# Patient Record
Sex: FEMALE | Race: WHITE | Employment: FULL TIME
[De-identification: names, ages, dates, MRNs, and addresses within clinical notes are randomized per-mention and may not be internally consistent; named-entity substitution may affect disease eponyms.]

---

## 2020-05-11 ENCOUNTER — NURSE TRIAGE (OUTPATIENT)
Dept: OTHER | Facility: CLINIC | Age: 55
End: 2020-05-11

## 2020-05-12 ENCOUNTER — OFFICE VISIT (OUTPATIENT)
Dept: PRIMARY CARE CLINIC | Age: 55
End: 2020-05-12
Payer: COMMERCIAL

## 2020-05-12 VITALS — HEART RATE: 75 BPM | OXYGEN SATURATION: 98 % | TEMPERATURE: 98.8 F

## 2020-05-12 PROCEDURE — G8428 CUR MEDS NOT DOCUMENT: HCPCS | Performed by: NURSE PRACTITIONER

## 2020-05-12 PROCEDURE — G8421 BMI NOT CALCULATED: HCPCS | Performed by: NURSE PRACTITIONER

## 2020-05-12 PROCEDURE — 3017F COLORECTAL CA SCREEN DOC REV: CPT | Performed by: NURSE PRACTITIONER

## 2020-05-12 PROCEDURE — 4004F PT TOBACCO SCREEN RCVD TLK: CPT | Performed by: NURSE PRACTITIONER

## 2020-05-12 PROCEDURE — 99212 OFFICE O/P EST SF 10 MIN: CPT | Performed by: NURSE PRACTITIONER

## 2020-05-12 NOTE — PROGRESS NOTES
5/12/2020    FLU/COVID-19 CLINIC EVALUATION    HPI  SYMPTOMS:    Symptom duration, days:  [] 1   [] 2   [x] 3   [] 4   [] 5   [] 6   [] 7   [] 8   [] 9   [] 10   [] 11   [] 12   [] 13 [] 14 +      Symptom course:   [] Worsening     [x] Stable     [] Improving    [] Fevers  [] Symptom (not measured)  [] Measured (Result: )  [] Chills  [] Cough  [] Coughing up blood  [] Productive  [] Dry  [] Chest Congestion  [x] Chest Tightness  [] Nasal Congestion  [] Runny Nose  [x] Feeling short of breath  [] Fatigue  [] Chest pain  [x] Headaches  [x]Tolerable  [] Severe  [] Sore throat  [] Muscle aches  [] Nausea  [] Decreased appetite  [] Vomiting  []Unable to keep fluids down  [] Diarrhea  []Severe    [x] OTHER SYMPTOMS:   Post nasal drip (allergies)    RISK FACTORS: No known exposure  [] Pregnant or possibly pregnant  [] Age over 61  [] Diabetes  [] Heart disease  [] Asthma  [] COPD/Other chronic lung diseases  [] Active Cancer  [] On Chemotherapy  [] Taking oral steroids  [] History Lymphoma/Leukemia  [] Close contact with a lab confirmed COVID-19 patient within 14 days of symptom onset  [] History of travel from affected geographical areas within 14 days of symptom onset  [] Health Care Worker Exposure no symptoms  [] Health Care Worker Exposure symptomatic      VITALS:  Vitals:    05/12/20 0903   Pulse: 75   Temp: 98.8 °F (37.1 °C)   TempSrc: Oral   SpO2: 98%        PHYSICAL EXAMINATION:  [x]Alert   [x]Oriented to person/place/time    [x]No apparent distress     []Toxic appearing    [x]Breathing appears normal     []Appears tachypneic         [x]Speaking in full sentences     TESTS:  POCT FLU:  [] Positive     []Negative  POCT STREP:  [] Positive     []Negative    [x] COVID-19 Test sent: [x] Blue   [] Red   [] Chest X-ray     ASSESSMENT:  [] Flu  [] Strep Throat  [] Uncertain Viral Respiratory Illness  [x] Possible COVID-19  [] Exposure COVID-19  [] Other:     PLAN:  [x] Discharge home with written instructions for:  [] Flu management  [] Strep throat management  [] Possible COVID-19 exposure with self-quarantine   [x] Possible COVID-19 with isolation instructions and management of symptoms  [] Follow-up with primary care physician or emergency department if worsens  [] Referred to emergency department for evaluation    [x] Evaluation per physician/nurse practitioner in clinic  [] Prescription sent in  [x] No Prescription sent in    An  electronic signature was used to authenticate this note.      --BEBE Melgar - CNP on 5/12/2020 at 9:33 AM

## 2020-05-12 NOTE — PATIENT INSTRUCTIONS
before eating or preparing food. If soap and water are not readily available, use an alcohol-based hand  with at least 60% alcohol, covering all surfaces of your hands and rubbing them together until they feel dry. Soap and water are the best option if hands are visibly dirty. Avoid touching your eyes, nose, and mouth with unwashed hands. Avoid sharing personal household items  You should not share dishes, drinking glasses, cups, eating utensils, towels, or bedding with other people or pets in your home. After using these items, they should be washed thoroughly with soap and water. Clean all high-touch surfaces everyday  High touch surfaces include counters, tabletops, doorknobs, bathroom fixtures, toilets, phones, keyboards, tablets, and bedside tables. Also, clean any surfaces that may have blood, stool, or body fluids on them. Use a household cleaning spray or wipe, according to the label instructions. Labels contain instructions for safe and effective use of the cleaning product including precautions you should take when applying the product, such as wearing gloves and making sure you have good ventilation during use of the product. Monitor your symptoms  Seek prompt medical attention if your illness is worsening (e.g., difficulty breathing). Before seeking care, call your healthcare provider and tell them that you have, or are being evaluated for, COVID-19. Put on a facemask before you enter the facility. These steps will help the healthcare providers office to keep other people in the office or waiting room from getting infected or exposed. Ask your healthcare provider to call the local or state health department. Persons who are placed under active monitoring or facilitated self-monitoring should follow instructions provided by their local health department or occupational health professionals, as appropriate. When working with your local health department check their available hours.   If you You can now view your medical record. Additional Information  If you have questions, please contact your physician practice where you receive care. Remember, [facility] is NOT to be used for urgent needs. For medical emergencies, dial 911.

## 2020-05-14 LAB
SARS-COV-2: NOT DETECTED
SOURCE: NORMAL

## 2020-05-20 ENCOUNTER — VIRTUAL VISIT (OUTPATIENT)
Dept: FAMILY MEDICINE CLINIC | Age: 55
End: 2020-05-20
Payer: COMMERCIAL

## 2020-05-20 PROCEDURE — 99203 OFFICE O/P NEW LOW 30 MIN: CPT | Performed by: NURSE PRACTITIONER

## 2020-05-20 PROCEDURE — 3017F COLORECTAL CA SCREEN DOC REV: CPT | Performed by: NURSE PRACTITIONER

## 2020-05-20 PROCEDURE — G8427 DOCREV CUR MEDS BY ELIG CLIN: HCPCS | Performed by: NURSE PRACTITIONER

## 2020-05-20 ASSESSMENT — ENCOUNTER SYMPTOMS
COUGH: 0
SHORTNESS OF BREATH: 0
WHEEZING: 0

## 2020-05-20 NOTE — PROGRESS NOTES
2020    TELEHEALTH EVALUATION -- Audio/Visual (During ASTGI-05 public health emergency)    HPI:    Reg Guevara (:  1965) has requested an audio/video evaluation for the following concern(s):    Pt was seeing Dr Donal Vasquez in . Pt works at [employer], she is banker, and works at [employer]. Pt was seen in the [facility] pt said it lasted for a day or two more but has not had sob or chest discomfort is resolved. Pt said she has been working since covid, she has been off for 14 days. Review of Systems   Respiratory: Negative for cough, shortness of breath and wheezing. All other systems reviewed and are negative. Prior to Visit Medications    Medication Sig Taking? Authorizing Provider   ESTERIFIED ESTROGENS PO Take 1 mg by mouth Yes Historical Provider, MD   PROGESTERONE MICRONIZED PO Take 2.5 mg by mouth Yes Historical Provider, MD       Social History     Tobacco Use    Smoking status: Former Smoker     Years: 20.00     Types: Cigarettes    Smokeless tobacco: Never Used   Substance Use Topics    Alcohol use: Yes     Comment: socially     Drug use: Never        No Known Allergies    PHYSICAL EXAMINATION:  [ INSTRUCTIONS:  \"[x]\" Indicates a positive item  \"[]\" Indicates a negative item  -- DELETE ALL ITEMS NOT EXAMINED]  Vital Signs: (As obtained by patient/caregiver or practitioner observation)    Blood pressure-  Heart rate-    Respiratory rate-    Temperature-  Pulse oximetry-     Constitutional: [x] Appears well-developed and well-nourished [x] No apparent distress      [] Abnormal-   Mental status  [x] Alert and awake  [x] Oriented to person/place/time []Able to follow commands      Eyes:  EOM    []  Normal  [] Abnormal-  Sclera  []  Normal  [] Abnormal -         Discharge []  None visible  [] Abnormal -    HENT:   [] Normocephalic, atraumatic.   [] Abnormal   [] Mouth/Throat: Mucous membranes are moist.     External Ears [] Normal  [] Abnormal-     Neck: [] No visualized mass     Pulmonary/Chest: [] Respiratory effort normal.  [] No visualized signs of difficulty breathing or respiratory distress        [] Abnormal-      Musculoskeletal:   [] Normal gait with no signs of ataxia         [] Normal range of motion of neck        [] Abnormal-       Neurological:        [] No Facial Asymmetry (Cranial nerve 7 motor function) (limited exam to video visit)          [] No gaze palsy        [] Abnormal-         Skin:        [] No significant exanthematous lesions or discoloration noted on facial skin         [] Abnormal-            Psychiatric:       [x] Normal Affect [x] No Hallucinations        [] Abnormal-     Other pertinent observable physical exam findings-     ASSESSMENT/PLAN:  1. Cough  Pt is feeling much better she has been free of symptoms for 72 hours so she can return to work, pt will follow up with me in the next couple of months for physical.       No follow-ups on file. Reg Guevara is a 48 y.o. female being evaluated by a Virtual Visit (video visit) encounter to address concerns as mentioned above. A caregiver was present when appropriate. Due to this being a TeleHealth encounter (During ZDIXS-14 public health emergency), evaluation of the following organ systems was limited: Vitals/Constitutional/EENT/Resp/CV/GI//MS/Neuro/Skin/Heme-Lymph-Imm. Pursuant to the emergency declaration under the [address] [place] [address] authority and the [employer] Act, this Virtual Visit was conducted with patient's (and/or legal guardian's) consent, to reduce the patient's risk of exposure to COVID-19 and provide necessary medical care. The patient (and/or legal guardian) has also been advised to contact this office for worsening conditions or problems, and seek emergency medical treatment and/or call 911 if deemed necessary.      Patient identification was verified at the start of the visit: Yes    Total time spent on this encounter: Not billed by time    Services were provided through a video synchronous discussion virtually to substitute for in-person clinic visit. Patient and provider were located at their individual homes. --BEBE Browne CNP on 5/20/2020 at 3:06 PM    An electronic signature was used to authenticate this note.

## 2020-10-19 ENCOUNTER — OFFICE VISIT (OUTPATIENT)
Dept: FAMILY MEDICINE CLINIC | Age: 55
End: 2020-10-19
Payer: COMMERCIAL

## 2020-10-19 ENCOUNTER — NURSE TRIAGE (OUTPATIENT)
Dept: OTHER | Facility: CLINIC | Age: 55
End: 2020-10-19

## 2020-10-19 VITALS
SYSTOLIC BLOOD PRESSURE: 102 MMHG | OXYGEN SATURATION: 97 % | HEART RATE: 61 BPM | TEMPERATURE: 97.8 F | DIASTOLIC BLOOD PRESSURE: 72 MMHG | WEIGHT: 181 LBS

## 2020-10-19 LAB
A/G RATIO: 2.2 (ref 1.1–2.2)
ALBUMIN SERPL-MCNC: 4.4 G/DL (ref 3.4–5)
ALP BLD-CCNC: 52 U/L (ref 40–129)
ALT SERPL-CCNC: 12 U/L (ref 10–40)
ANION GAP SERPL CALCULATED.3IONS-SCNC: 11 MMOL/L (ref 3–16)
AST SERPL-CCNC: 14 U/L (ref 15–37)
BASOPHILS ABSOLUTE: 0 K/UL (ref 0–0.2)
BASOPHILS RELATIVE PERCENT: 0.6 %
BILIRUB SERPL-MCNC: 0.3 MG/DL (ref 0–1)
BUN BLDV-MCNC: 11 MG/DL (ref 7–20)
CALCIUM SERPL-MCNC: 9.3 MG/DL (ref 8.3–10.6)
CHLORIDE BLD-SCNC: 102 MMOL/L (ref 99–110)
CHOLESTEROL, TOTAL: 211 MG/DL (ref 0–199)
CO2: 25 MMOL/L (ref 21–32)
CREAT SERPL-MCNC: 0.7 MG/DL (ref 0.6–1.1)
EOSINOPHILS ABSOLUTE: 0.2 K/UL (ref 0–0.6)
EOSINOPHILS RELATIVE PERCENT: 2.3 %
GFR AFRICAN AMERICAN: >60
GFR NON-AFRICAN AMERICAN: >60
GLOBULIN: 2 G/DL
GLUCOSE BLD-MCNC: 85 MG/DL (ref 70–99)
HCT VFR BLD CALC: 37.9 % (ref 36–48)
HDLC SERPL-MCNC: 75 MG/DL (ref 40–60)
HEMOGLOBIN: 12.6 G/DL (ref 12–16)
LDL CHOLESTEROL CALCULATED: 124 MG/DL
LYMPHOCYTES ABSOLUTE: 2 K/UL (ref 1–5.1)
LYMPHOCYTES RELATIVE PERCENT: 27.7 %
MCH RBC QN AUTO: 31.1 PG (ref 26–34)
MCHC RBC AUTO-ENTMCNC: 33.3 G/DL (ref 31–36)
MCV RBC AUTO: 93.3 FL (ref 80–100)
MONOCYTES ABSOLUTE: 0.6 K/UL (ref 0–1.3)
MONOCYTES RELATIVE PERCENT: 8 %
NEUTROPHILS ABSOLUTE: 4.4 K/UL (ref 1.7–7.7)
NEUTROPHILS RELATIVE PERCENT: 61.4 %
PDW BLD-RTO: 13.2 % (ref 12.4–15.4)
PLATELET # BLD: 268 K/UL (ref 135–450)
PMV BLD AUTO: 8.8 FL (ref 5–10.5)
POTASSIUM SERPL-SCNC: 4 MMOL/L (ref 3.5–5.1)
RBC # BLD: 4.06 M/UL (ref 4–5.2)
SODIUM BLD-SCNC: 138 MMOL/L (ref 136–145)
TOTAL PROTEIN: 6.4 G/DL (ref 6.4–8.2)
TRIGL SERPL-MCNC: 62 MG/DL (ref 0–150)
TSH REFLEX: 2.98 UIU/ML (ref 0.27–4.2)
VLDLC SERPL CALC-MCNC: 12 MG/DL
WBC # BLD: 7.2 K/UL (ref 4–11)

## 2020-10-19 PROCEDURE — G8482 FLU IMMUNIZE ORDER/ADMIN: HCPCS | Performed by: NURSE PRACTITIONER

## 2020-10-19 PROCEDURE — 90471 IMMUNIZATION ADMIN: CPT | Performed by: NURSE PRACTITIONER

## 2020-10-19 PROCEDURE — 36415 COLL VENOUS BLD VENIPUNCTURE: CPT | Performed by: NURSE PRACTITIONER

## 2020-10-19 PROCEDURE — 90686 IIV4 VACC NO PRSV 0.5 ML IM: CPT | Performed by: NURSE PRACTITIONER

## 2020-10-19 PROCEDURE — 99396 PREV VISIT EST AGE 40-64: CPT | Performed by: NURSE PRACTITIONER

## 2020-10-19 ASSESSMENT — PATIENT HEALTH QUESTIONNAIRE - PHQ9
SUM OF ALL RESPONSES TO PHQ QUESTIONS 1-9: 0
2. FEELING DOWN, DEPRESSED OR HOPELESS: 0
SUM OF ALL RESPONSES TO PHQ QUESTIONS 1-9: 0
SUM OF ALL RESPONSES TO PHQ QUESTIONS 1-9: 0
1. LITTLE INTEREST OR PLEASURE IN DOING THINGS: 0
SUM OF ALL RESPONSES TO PHQ9 QUESTIONS 1 & 2: 0

## 2020-10-19 ASSESSMENT — ENCOUNTER SYMPTOMS
NAUSEA: 0
VOMITING: 0
COUGH: 0
DIARRHEA: 0
RECTAL PAIN: 0
WHEEZING: 0
SHORTNESS OF BREATH: 0

## 2020-10-19 NOTE — PROGRESS NOTES
Vaccine Information Sheet, \"Influenza - Inactivated\"  given to [employer], or parent/legal guardian of  [employer] and verbalized understanding. Patient responses:    Have you ever had a reaction to a flu vaccine? No  Are you able to eat eggs without adverse effects? Yes  Do you have any current illness? No  Have you ever had Guillian [place] Syndrome? No    Flu vaccine given per order. Please see immunization tab.

## 2020-10-19 NOTE — PROGRESS NOTES
OUTPATIENT PROGRESS NOTE  Date of Service:  10/19/2020  Address: [address] [place] [address] [facility]  [address] [place], [place] [address]  [address] [place] [address]  Dept: [phone]  Loc: [phone]    Subjective:      Patient ID:  <[id number]>  Jamil Zambrano is a 50 y.o. female    HPI: pt is here with facial swelling, pt said it started yesterday. Pt said she put ice on her face. Pt did not take any medication prior to the swelling. Pt has not eaten anything she has not eaten before. She pt said face feels funny if she touches. Pt said she did not have any new make up or face lotions. Pt said left side does feel a little numb. Pt said swelling was worse over the eyes. Pt does not typical have sensitive skin or have sensitive feeling in her face. Pt said she did not notice her face drooping. Pt is getting  Up once at night to urinate, other then that she sleeps well. Pt works 2 jobs, she can feel tired at the end of the day     Review of Systems   Constitutional: Negative for diaphoresis, fatigue and fever. HENT:        Facial swelling    Respiratory: Negative for cough, shortness of breath and wheezing. Gastrointestinal: Negative for diarrhea, nausea, rectal pain and vomiting. Psychiatric/Behavioral: Positive for sleep disturbance (wakes up ). All other systems reviewed and are negative. Objective:   Physical Exam  Vitals signs reviewed. Constitutional:       Appearance: Normal appearance. HENT:      Head:      Comments: Mild swelling on face   Cardiovascular:      Rate and Rhythm: Normal rate and regular rhythm. Pulses: Normal pulses. Heart sounds: Normal heart sounds. Pulmonary:      Effort: Pulmonary effort is normal.      Breath sounds: Normal breath sounds. Abdominal:      General: Abdomen is flat. Skin:     Capillary Refill: Capillary refill takes less than 2 seconds. Neurological:      General: No focal deficit present.       Mental Status: She is alert and oriented to person, place, and time. Psychiatric:         Mood and Affect: Mood normal.         Behavior: Behavior normal.         Thought Content: Thought content normal.         Judgment: Judgment normal.           Assessment/Plan   1. Edema of both orbits  Unsure etiology of patient's orbital edema. Patient's edema has improved today. Patient can continue Benadryl at night suggested patient take Allegra for the next month or 2 for possible allergies. Next up would be allergy panel with an allergist.  Patient will get regular blood work today. - CBC Auto Differential  - Comprehensive Metabolic Panel  - TSH with Reflex    2. Need for influenza vaccination  Pt is due for flu   - INFLUENZA, QUADV, 3 YRS AND OLDER, IM PF, PREFILL SYR OR SDV, 0.5ML (AFLURIA QUADV, PF)    3. Encounter for preventive care  - pt is due for cholesterol she is not fasting. Will get redrawn if abnormal fasting in 6 months   - Lipid Panel    4.  Screen for colon cancer  Due for colon cancer screening   - Macrina Joshi MD, Gastroenterology, [facility]      Pt will follow up in 1 year as long as blood work is ok               Luis Cody, APRN - CNP

## 2022-03-22 SDOH — HEALTH STABILITY: PHYSICAL HEALTH: ON AVERAGE, HOW MANY DAYS PER WEEK DO YOU ENGAGE IN MODERATE TO STRENUOUS EXERCISE (LIKE A BRISK WALK)?: 2 DAYS

## 2022-03-22 SDOH — HEALTH STABILITY: PHYSICAL HEALTH: ON AVERAGE, HOW MANY MINUTES DO YOU ENGAGE IN EXERCISE AT THIS LEVEL?: 30 MIN

## 2022-03-22 ASSESSMENT — SOCIAL DETERMINANTS OF HEALTH (SDOH)
WITHIN THE LAST YEAR, HAVE YOU BEEN HUMILIATED OR EMOTIONALLY ABUSED IN OTHER WAYS BY YOUR PARTNER OR EX-PARTNER?: NO
WITHIN THE LAST YEAR, HAVE YOU BEEN AFRAID OF YOUR PARTNER OR EX-PARTNER?: NO
WITHIN THE LAST YEAR, HAVE TO BEEN RAPED OR FORCED TO HAVE ANY KIND OF SEXUAL ACTIVITY BY YOUR PARTNER OR EX-PARTNER?: NO
WITHIN THE LAST YEAR, HAVE YOU BEEN KICKED, HIT, SLAPPED, OR OTHERWISE PHYSICALLY HURT BY YOUR PARTNER OR EX-PARTNER?: NO

## 2022-03-25 ENCOUNTER — OFFICE VISIT (OUTPATIENT)
Dept: FAMILY MEDICINE CLINIC | Age: 57
End: 2022-03-25
Payer: COMMERCIAL

## 2022-03-25 VITALS
BODY MASS INDEX: 32.47 KG/M2 | DIASTOLIC BLOOD PRESSURE: 72 MMHG | HEIGHT: 66 IN | SYSTOLIC BLOOD PRESSURE: 118 MMHG | OXYGEN SATURATION: 98 % | RESPIRATION RATE: 16 BRPM | HEART RATE: 57 BPM | WEIGHT: 202 LBS

## 2022-03-25 DIAGNOSIS — R35.0 URINARY FREQUENCY: Primary | ICD-10-CM

## 2022-03-25 DIAGNOSIS — Z76.89 ENCOUNTER TO ESTABLISH CARE: ICD-10-CM

## 2022-03-25 DIAGNOSIS — Z13.220 SCREENING FOR LIPID DISORDERS: ICD-10-CM

## 2022-03-25 DIAGNOSIS — Z13.1 SCREENING FOR DIABETES MELLITUS: ICD-10-CM

## 2022-03-25 DIAGNOSIS — Z13.29 SCREENING FOR THYROID DISORDER: ICD-10-CM

## 2022-03-25 LAB
BILIRUBIN, POC: NORMAL
BLOOD URINE, POC: NORMAL
CLARITY, POC: NORMAL
COLOR, POC: NORMAL
GLUCOSE URINE, POC: NEGATIVE
KETONES, POC: NORMAL
LEUKOCYTE EST, POC: NORMAL
NITRITE, POC: NEGATIVE
PH, POC: 6.5
PROTEIN, POC: NEGATIVE
SPECIFIC GRAVITY, POC: 1.02
UROBILINOGEN, POC: 0.2

## 2022-03-25 PROCEDURE — G8417 CALC BMI ABV UP PARAM F/U: HCPCS | Performed by: NURSE PRACTITIONER

## 2022-03-25 PROCEDURE — 81002 URINALYSIS NONAUTO W/O SCOPE: CPT | Performed by: NURSE PRACTITIONER

## 2022-03-25 PROCEDURE — 3017F COLORECTAL CA SCREEN DOC REV: CPT | Performed by: NURSE PRACTITIONER

## 2022-03-25 PROCEDURE — 99214 OFFICE O/P EST MOD 30 MIN: CPT | Performed by: NURSE PRACTITIONER

## 2022-03-25 PROCEDURE — G8427 DOCREV CUR MEDS BY ELIG CLIN: HCPCS | Performed by: NURSE PRACTITIONER

## 2022-03-25 PROCEDURE — G8484 FLU IMMUNIZE NO ADMIN: HCPCS | Performed by: NURSE PRACTITIONER

## 2022-03-25 PROCEDURE — 36415 COLL VENOUS BLD VENIPUNCTURE: CPT | Performed by: NURSE PRACTITIONER

## 2022-03-25 PROCEDURE — 1036F TOBACCO NON-USER: CPT | Performed by: NURSE PRACTITIONER

## 2022-03-25 ASSESSMENT — PATIENT HEALTH QUESTIONNAIRE - PHQ9
SUM OF ALL RESPONSES TO PHQ QUESTIONS 1-9: 0
SUM OF ALL RESPONSES TO PHQ QUESTIONS 1-9: 0
3. TROUBLE FALLING OR STAYING ASLEEP: 0
6. FEELING BAD ABOUT YOURSELF - OR THAT YOU ARE A FAILURE OR HAVE LET YOURSELF OR YOUR FAMILY DOWN: 0
1. LITTLE INTEREST OR PLEASURE IN DOING THINGS: 0
SUM OF ALL RESPONSES TO PHQ QUESTIONS 1-9: 0
SUM OF ALL RESPONSES TO PHQ9 QUESTIONS 1 & 2: 0
10. IF YOU CHECKED OFF ANY PROBLEMS, HOW DIFFICULT HAVE THESE PROBLEMS MADE IT FOR YOU TO DO YOUR WORK, TAKE CARE OF THINGS AT HOME, OR GET ALONG WITH OTHER PEOPLE: 0
5. POOR APPETITE OR OVEREATING: 0
8. MOVING OR SPEAKING SO SLOWLY THAT OTHER PEOPLE COULD HAVE NOTICED. OR THE OPPOSITE, BEING SO FIGETY OR RESTLESS THAT YOU HAVE BEEN MOVING AROUND A LOT MORE THAN USUAL: 0
9. THOUGHTS THAT YOU WOULD BE BETTER OFF DEAD, OR OF HURTING YOURSELF: 0
7. TROUBLE CONCENTRATING ON THINGS, SUCH AS READING THE NEWSPAPER OR WATCHING TELEVISION: 0
SUM OF ALL RESPONSES TO PHQ QUESTIONS 1-9: 0
4. FEELING TIRED OR HAVING LITTLE ENERGY: 0
2. FEELING DOWN, DEPRESSED OR HOPELESS: 0

## 2022-03-25 ASSESSMENT — COLUMBIA-SUICIDE SEVERITY RATING SCALE - C-SSRS
6. HAVE YOU EVER DONE ANYTHING, STARTED TO DO ANYTHING, OR PREPARED TO DO ANYTHING TO END YOUR LIFE?: NO
1. WITHIN THE PAST MONTH, HAVE YOU WISHED YOU WERE DEAD OR WISHED YOU COULD GO TO SLEEP AND NOT WAKE UP?: NO
2. HAVE YOU ACTUALLY HAD ANY THOUGHTS OF KILLING YOURSELF?: NO

## 2022-03-25 ASSESSMENT — ENCOUNTER SYMPTOMS
GASTROINTESTINAL NEGATIVE: 1
RESPIRATORY NEGATIVE: 1
EYES NEGATIVE: 1

## 2022-03-25 NOTE — PROGRESS NOTES
[facility]  Establish care visit   3/25/2022    Claudean Rubenstein (:  1965) is a 49 y.o. female, here to establish care. Chief Complaint   Patient presents with   Myrick Establish Care     Pt is here to establish care, and patient is fasting         ASSESSMENT/ PLAN  1. Urinary frequency  - POCT Urine with Microscopic    2. Encounter to establish care  - CBC with Auto Differential  - Comprehensive Metabolic Panel    3. Screening for diabetes mellitus  - Hemoglobin A1C    4. Screening for lipid disorders  - Lipid Panel    5. Screening for thyroid disorder  - TSH  - T4, Free       No follow-ups on file. HPI  Patient is here to establish care, concerned about weight gain since hysterectomy, c/o of urinary frequency. Patient stated it started a week ago and she is her to f/u. She denied any discharge. ROS  Review of Systems   Constitutional: Negative. HENT: Negative. Eyes: Negative. Respiratory: Negative. Cardiovascular: Negative. Gastrointestinal: Negative. Endocrine: Negative. Genitourinary: Positive for frequency. Musculoskeletal: Negative. Skin: Negative. Neurological: Negative. Hematological: Negative. Psychiatric/Behavioral: Negative. HISTORIES  No current outpatient medications on file prior to visit. No current facility-administered medications on file prior to visit. No Known Allergies    Past Medical History:   Diagnosis Date    Allergic rhinitis        There is no problem list on file for this patient.       Past Surgical History:   Procedure Laterality Date    HYSTERECTOMY, VAGINAL         Social History     Socioeconomic History    Marital status:      Spouse name: Not on file    Number of children: Not on file    Years of education: Not on file    Highest education level: Not on file   Occupational History    Not on file   Tobacco Use    Smoking status: Former Smoker     Years: 20.00     Types: Cigarettes    Smokeless tobacco: Never Used   Vaping Use    Vaping Use: Never used   Substance and Sexual Activity    Alcohol use: Yes     Alcohol/week: 2.0 - 3.0 standard drinks     Types: 2 - 3 Glasses of wine per week     Comment: socially     Drug use: Never    Sexual activity: Yes     Partners: Male   Other Topics Concern    Not on file   Social History Narrative    Not on file     Social Determinants of Health     Financial Resource Strain:     Difficulty of Paying Living Expenses: Not on file   Food Insecurity:     Worried About Running Out of Food in the Last Year: Not on file    Mikey of Food in the Last Year: Not on file   Transportation Needs:     Lack of Transportation (Medical): Not on file    Lack of Transportation (Non-Medical):  Not on file   Physical Activity: Insufficiently Active    Days of Exercise per Week: 2 days    Minutes of Exercise per Session: 30 min   Stress:     Feeling of Stress : Not on file   Social Connections:     Frequency of Communication with Friends and Family: Not on file    Frequency of Social Gatherings with Friends and Family: Not on file    Attends Gnosticism Services: Not on file    Active Member of Clubs or Organizations: Not on file    Attends Club or Organization Meetings: Not on file    Marital Status: Not on file   Intimate Partner Violence: Not At Risk    Fear of Current or Ex-Partner: No    Emotionally Abused: No    Physically Abused: No    Sexually Abused: No   Housing Stability:     Unable to Pay for Housing in the Last Year: Not on file    Number of [place] in the Last Year: Not on file    Unstable Housing in the Last Year: Not on file        Family History   Problem Relation Age of Onset    Cancer Mother         bladder cancer       PE  Vitals:    03/25/22 0747   BP: 118/72   Site: Right Upper Arm   Position: Sitting   Pulse: 57   Resp: 16   SpO2: 98%   Weight: 202 lb (91.6 kg)   Height: 5' 5.7\" (1.669 m)     Estimated body mass index is 32.9 kg/m² as calculated from the following:    Height as of this encounter: 5' 5.7\" (1.669 m). Weight as of this encounter: 202 lb (91.6 kg). Physical Exam  HENT:      Right Ear: Tympanic membrane and ear canal normal.      Left Ear: Tympanic membrane and ear canal normal.      Nose: Nose normal.      Mouth/Throat:      Mouth: Mucous membranes are moist.   Eyes:      Extraocular Movements: Extraocular movements intact. Cardiovascular:      Rate and Rhythm: Normal rate. Pulses: Normal pulses. Heart sounds: Normal heart sounds. Pulmonary:      Effort: Pulmonary effort is normal.      Breath sounds: Normal breath sounds. Abdominal:      General: Bowel sounds are normal.      Palpations: Abdomen is soft. Musculoskeletal:         General: Normal range of motion. Cervical back: Normal range of motion. Skin:     General: Skin is warm. Neurological:      General: No focal deficit present. Mental Status: She is alert and oriented to person, place, and time. Psychiatric:         Mood and Affect: Mood normal.         Behavior: Behavior normal.         Thought Content:  Thought content normal.         Judgment: Judgment normal.         Immunization History   Administered Date(s) Administered    Influenza, Quadv, IM, PF (6 mo and older Fluzone, Flulaval, Fluarix, and 3 yrs and older Afluria) 10/19/2020    Td Vaccine >6yo [facility] 04/12/2004    Tdap (Boostrix, Adacel) 10/28/2015       Health Maintenance   Topic Date Due    COVID-19 Vaccine (1) Never done    Colorectal Cancer Screen  Never done    Shingles Vaccine (1 of 2) Never done    Flu vaccine (1) 03/25/2023 (Originally 9/1/2021)    Breast cancer screen  05/25/2022    Depression Screen  03/25/2023    Lipid screen  10/19/2025    DTaP/Tdap/Td vaccine (2 - Td or Tdap) 10/28/2025    Hepatitis A vaccine  Aged Out    Hepatitis B vaccine  Aged Out    Hib vaccine  Aged Out    Meningococcal (ACWY) vaccine  Aged Out    Pneumococcal 0-64 years Vaccine  Aged Out    Hepatitis C screen  Discontinued    HIV screen  Discontinued       PSH, PMH, SH and FH reviewed and noted. Recent and past labs, tests and consults also reviewed. Recent or new meds also reviewed. BEBE Mcfadden CNP    This dictation was generated by voice recognition computer software. Although all attempts are made to edit the dictation for accuracy, there may be errors in the transcription that are not intended.

## 2022-03-26 LAB
A/G RATIO: 2.6 (ref 1.1–2.2)
ALBUMIN SERPL-MCNC: 5.1 G/DL (ref 3.4–5)
ALP BLD-CCNC: 69 U/L (ref 40–129)
ALT SERPL-CCNC: 22 U/L (ref 10–40)
ANION GAP SERPL CALCULATED.3IONS-SCNC: 19 MMOL/L (ref 3–16)
AST SERPL-CCNC: 18 U/L (ref 15–37)
BASOPHILS ABSOLUTE: 0.1 K/UL (ref 0–0.2)
BASOPHILS RELATIVE PERCENT: 0.9 %
BILIRUB SERPL-MCNC: 0.4 MG/DL (ref 0–1)
BUN BLDV-MCNC: 14 MG/DL (ref 7–20)
CALCIUM SERPL-MCNC: 9.8 MG/DL (ref 8.3–10.6)
CHLORIDE BLD-SCNC: 101 MMOL/L (ref 99–110)
CHOLESTEROL, TOTAL: 246 MG/DL (ref 0–199)
CO2: 23 MMOL/L (ref 21–32)
CREAT SERPL-MCNC: 0.6 MG/DL (ref 0.6–1.1)
EOSINOPHILS ABSOLUTE: 0.2 K/UL (ref 0–0.6)
EOSINOPHILS RELATIVE PERCENT: 3.7 %
ESTIMATED AVERAGE GLUCOSE: 108.3 MG/DL
GFR AFRICAN AMERICAN: >60
GFR NON-AFRICAN AMERICAN: >60
GLUCOSE BLD-MCNC: 68 MG/DL (ref 70–99)
HBA1C MFR BLD: 5.4 %
HCT VFR BLD CALC: 41.4 % (ref 36–48)
HDLC SERPL-MCNC: 63 MG/DL (ref 40–60)
HEMOGLOBIN: 13.7 G/DL (ref 12–16)
LDL CHOLESTEROL CALCULATED: 166 MG/DL
LYMPHOCYTES ABSOLUTE: 2.1 K/UL (ref 1–5.1)
LYMPHOCYTES RELATIVE PERCENT: 31.2 %
MCH RBC QN AUTO: 30.3 PG (ref 26–34)
MCHC RBC AUTO-ENTMCNC: 33.1 G/DL (ref 31–36)
MCV RBC AUTO: 91.5 FL (ref 80–100)
MONOCYTES ABSOLUTE: 0.4 K/UL (ref 0–1.3)
MONOCYTES RELATIVE PERCENT: 6.5 %
NEUTROPHILS ABSOLUTE: 3.8 K/UL (ref 1.7–7.7)
NEUTROPHILS RELATIVE PERCENT: 57.7 %
PDW BLD-RTO: 13.4 % (ref 12.4–15.4)
PLATELET # BLD: 316 K/UL (ref 135–450)
PMV BLD AUTO: 7.9 FL (ref 5–10.5)
POTASSIUM SERPL-SCNC: 4.5 MMOL/L (ref 3.5–5.1)
RBC # BLD: 4.53 M/UL (ref 4–5.2)
SODIUM BLD-SCNC: 143 MMOL/L (ref 136–145)
T4 FREE: 1.2 NG/DL (ref 0.9–1.8)
TOTAL PROTEIN: 7.1 G/DL (ref 6.4–8.2)
TRIGL SERPL-MCNC: 85 MG/DL (ref 0–150)
TSH SERPL DL<=0.05 MIU/L-ACNC: 2.45 UIU/ML (ref 0.27–4.2)
VLDLC SERPL CALC-MCNC: 17 MG/DL
WBC # BLD: 6.7 K/UL (ref 4–11)

## 2022-03-29 DIAGNOSIS — E78.5 HYPERLIPIDEMIA, UNSPECIFIED HYPERLIPIDEMIA TYPE: Primary | ICD-10-CM

## 2022-03-29 RX ORDER — ATORVASTATIN CALCIUM 20 MG/1
20 TABLET, FILM COATED ORAL DAILY
Qty: 90 TABLET | Refills: 1 | Status: SHIPPED | OUTPATIENT
Start: 2022-03-29 | End: 2022-09-23

## 2022-05-18 RX ORDER — ESTRADIOL 0.5 MG/1
0.5 TABLET ORAL DAILY
COMMUNITY

## 2022-05-18 NOTE — PROGRESS NOTES

## 2022-05-18 NOTE — PROGRESS NOTES
Obstructive Sleep Apnea (KEN) Screening     Patient:  Lucille Paz    YOB: 1965      Medical Record #:  [id number]                     Date:  5/18/2022     1. Are you a loud and/or regular snorer? []  Yes       [x] No    2. Have you been observed to gasp or stop breathing during sleep? []  Yes       [x] No    3. Do you feel tired or groggy upon awakening or do you awaken with a headache?           []  Yes       [] No    4. Are you often tired or fatigued during the wake time hours? []  Yes       [] No    5. Do you fall asleep sitting, reading, watching TV or driving? []  Yes       [] No    6. Do you often have problems with memory or concentration? []  Yes       [] No    **If patient's score is ? 3 they are considered high risk for KEN. An Anesthesia provider will evaluate the patient and develop a plan of care the day of surgery. Note:  If the patient's BMI is more than 35 kg m¯² , has neck circumference > 40 cm, and/or high blood pressure the risk is greater (© American Sleep Apnea Association, 2006).

## 2022-05-24 ENCOUNTER — ANESTHESIA EVENT (OUTPATIENT)
Dept: ENDOSCOPY | Age: 57
End: 2022-05-24
Payer: COMMERCIAL

## 2022-05-24 ENCOUNTER — HOSPITAL ENCOUNTER (OUTPATIENT)
Age: 57
Setting detail: OUTPATIENT SURGERY
Discharge: HOME | End: 2022-05-24
Attending: INTERNAL MEDICINE | Admitting: INTERNAL MEDICINE
Payer: COMMERCIAL

## 2022-05-24 ENCOUNTER — ANESTHESIA (OUTPATIENT)
Dept: ENDOSCOPY | Age: 57
End: 2022-05-24
Payer: COMMERCIAL

## 2022-05-24 VITALS
OXYGEN SATURATION: 99 % | RESPIRATION RATE: 16 BRPM | HEART RATE: 55 BPM | HEIGHT: 66 IN | SYSTOLIC BLOOD PRESSURE: 108 MMHG | TEMPERATURE: 98 F | BODY MASS INDEX: 31.82 KG/M2 | DIASTOLIC BLOOD PRESSURE: 58 MMHG | WEIGHT: 198 LBS

## 2022-05-24 DIAGNOSIS — Z12.11 COLON CANCER SCREENING: ICD-10-CM

## 2022-05-24 PROCEDURE — 7100000011 HC PHASE II RECOVERY - ADDTL 15 MIN: Performed by: INTERNAL MEDICINE

## 2022-05-24 PROCEDURE — 3700000000 HC ANESTHESIA ATTENDED CARE: Performed by: INTERNAL MEDICINE

## 2022-05-24 PROCEDURE — 2500000003 HC RX 250 WO HCPCS: Performed by: NURSE ANESTHETIST, CERTIFIED REGISTERED

## 2022-05-24 PROCEDURE — 2709999900 HC NON-CHARGEABLE SUPPLY: Performed by: INTERNAL MEDICINE

## 2022-05-24 PROCEDURE — 7100000010 HC PHASE II RECOVERY - FIRST 15 MIN: Performed by: INTERNAL MEDICINE

## 2022-05-24 PROCEDURE — 3700000001 HC ADD 15 MINUTES (ANESTHESIA): Performed by: INTERNAL MEDICINE

## 2022-05-24 PROCEDURE — 2580000003 HC RX 258: Performed by: INTERNAL MEDICINE

## 2022-05-24 PROCEDURE — 3609010700 HC COLONOSCOPY POLYPECTOMY REMOVAL SNARE/STOMA: Performed by: INTERNAL MEDICINE

## 2022-05-24 PROCEDURE — 88305 TISSUE EXAM BY PATHOLOGIST: CPT

## 2022-05-24 PROCEDURE — 6360000002 HC RX W HCPCS: Performed by: NURSE ANESTHETIST, CERTIFIED REGISTERED

## 2022-05-24 RX ORDER — LIDOCAINE HYDROCHLORIDE 10 MG/ML
0.1 INJECTION, SOLUTION EPIDURAL; INFILTRATION; INTRACAUDAL; PERINEURAL
Status: DISCONTINUED | OUTPATIENT
Start: 2022-05-24 | End: 2022-05-24 | Stop reason: HOSPADM

## 2022-05-24 RX ORDER — VITAMIN B COMPLEX
1 CAPSULE ORAL DAILY
COMMUNITY

## 2022-05-24 RX ORDER — ASCORBIC ACID 500 MG
500 TABLET ORAL DAILY
COMMUNITY

## 2022-05-24 RX ORDER — LIDOCAINE HYDROCHLORIDE 10 MG/ML
INJECTION, SOLUTION INFILTRATION; PERINEURAL PRN
Status: DISCONTINUED | OUTPATIENT
Start: 2022-05-24 | End: 2022-05-24 | Stop reason: SDUPTHER

## 2022-05-24 RX ORDER — ZINC GLUCONATE 50 MG
50 TABLET ORAL DAILY
COMMUNITY

## 2022-05-24 RX ORDER — CETIRIZINE HYDROCHLORIDE 10 MG/1
10 TABLET ORAL DAILY
COMMUNITY

## 2022-05-24 RX ORDER — PROPOFOL 10 MG/ML
INJECTION, EMULSION INTRAVENOUS PRN
Status: DISCONTINUED | OUTPATIENT
Start: 2022-05-24 | End: 2022-05-24 | Stop reason: SDUPTHER

## 2022-05-24 RX ORDER — SODIUM CHLORIDE, SODIUM LACTATE, POTASSIUM CHLORIDE, CALCIUM CHLORIDE 600; 310; 30; 20 MG/100ML; MG/100ML; MG/100ML; MG/100ML
INJECTION, SOLUTION INTRAVENOUS ONCE
Status: COMPLETED | OUTPATIENT
Start: 2022-05-24 | End: 2022-05-24

## 2022-05-24 RX ADMIN — PROPOFOL 30 MG: 10 INJECTION, EMULSION INTRAVENOUS at 12:55

## 2022-05-24 RX ADMIN — PROPOFOL 30 MG: 10 INJECTION, EMULSION INTRAVENOUS at 12:57

## 2022-05-24 RX ADMIN — PROPOFOL 30 MG: 10 INJECTION, EMULSION INTRAVENOUS at 12:43

## 2022-05-24 RX ADMIN — PROPOFOL 60 MG: 10 INJECTION, EMULSION INTRAVENOUS at 12:37

## 2022-05-24 RX ADMIN — PROPOFOL 30 MG: 10 INJECTION, EMULSION INTRAVENOUS at 12:48

## 2022-05-24 RX ADMIN — PROPOFOL 30 MG: 10 INJECTION, EMULSION INTRAVENOUS at 12:41

## 2022-05-24 RX ADMIN — PROPOFOL 20 MG: 10 INJECTION, EMULSION INTRAVENOUS at 12:53

## 2022-05-24 RX ADMIN — PROPOFOL 20 MG: 10 INJECTION, EMULSION INTRAVENOUS at 12:58

## 2022-05-24 RX ADMIN — PROPOFOL 30 MG: 10 INJECTION, EMULSION INTRAVENOUS at 12:45

## 2022-05-24 RX ADMIN — PROPOFOL 30 MG: 10 INJECTION, EMULSION INTRAVENOUS at 12:52

## 2022-05-24 RX ADMIN — PROPOFOL 20 MG: 10 INJECTION, EMULSION INTRAVENOUS at 12:59

## 2022-05-24 RX ADMIN — LIDOCAINE HYDROCHLORIDE 50 MG: 10 INJECTION, SOLUTION INFILTRATION; PERINEURAL at 12:37

## 2022-05-24 RX ADMIN — PROPOFOL 30 MG: 10 INJECTION, EMULSION INTRAVENOUS at 12:39

## 2022-05-24 RX ADMIN — PROPOFOL 30 MG: 10 INJECTION, EMULSION INTRAVENOUS at 12:47

## 2022-05-24 RX ADMIN — SODIUM CHLORIDE, POTASSIUM CHLORIDE, SODIUM LACTATE AND CALCIUM CHLORIDE: 600; 310; 30; 20 INJECTION, SOLUTION INTRAVENOUS at 11:49

## 2022-05-24 RX ADMIN — PROPOFOL 30 MG: 10 INJECTION, EMULSION INTRAVENOUS at 12:50

## 2022-05-24 ASSESSMENT — PAIN - FUNCTIONAL ASSESSMENT: PAIN_FUNCTIONAL_ASSESSMENT: NONE - DENIES PAIN

## 2022-05-24 ASSESSMENT — PAIN SCALES - GENERAL: PAINLEVEL_OUTOF10: 0

## 2022-05-24 NOTE — ANESTHESIA POSTPROCEDURE EVALUATION
Department of Anesthesiology  Postprocedure Note    Patient: Mookie Virk  MRN: [id number]  YOB: 1965  Date of evaluation: 5/24/2022  Time:  3:00 PM     Procedure Summary     Date: 05/24/22 Room / Location: [facility] [address]    Anesthesia Start: 1237 Anesthesia Stop: 4457    Procedure: COLONOSCOPY POLYPECTOMY REMOVAL SNARE/STOMA (N/A ) Diagnosis:       Colon cancer screening      (Colon cancer screening [Z12.11])    Surgeons: Sarah Echevarria MD Responsible Provider: Bruno Abad MD    Anesthesia Type: general ASA Status: 2          Anesthesia Type: No value filed. Tim Phase I: Tim Score: 10    Tim Phase II: Tim Score: 10    Last vitals: Reviewed and per EMR flowsheets.        Anesthesia Post Evaluation    Comments: Postoperative Anesthesia Note    Name:    Mookie Virk  MRN:      [id number]    Patient Vitals in the past 12 hrs:  05/24/22 1325, BP:(!) 108/58, Pulse:55, Resp:16, SpO2:99 %  05/24/22 1320, BP:(!) 104/57, Pulse:54, Resp:12, SpO2:99 %  05/24/22 1315, SpO2:98 %  05/24/22 1310, BP:110/78, Pulse:69, Resp:14, SpO2:98 %  05/24/22 1305, BP:101/77, Pulse:64, Resp:15, SpO2:97 %  05/24/22 1141, BP:(!) 116/56, Temp:98 °F (36.7 °C), Pulse:72, Resp:16, SpO2:97 %, Height:5' 6\" (1.676 m), Weight:198 lb (89.8 kg)     LABS:    CBC  Lab Results       Component                Value               Date/Time                  WBC                      6.7                 03/25/2022 08:16 AM        HGB                      13.7                03/25/2022 08:16 AM        HCT                      41.4                03/25/2022 08:16 AM        PLT                      316                 03/25/2022 08:16 AM   RENAL  Lab Results       Component                Value               Date/Time                  NA                       143                 03/25/2022 08:16 AM        K                        4.5                 03/25/2022 08:16 AM        CL 101                 03/25/2022 08:16 AM        CO2                      23                  03/25/2022 08:16 AM        BUN                      14                  03/25/2022 08:16 AM        CREATININE               0.6                 03/25/2022 08:16 AM        GLUCOSE                  68 (L)              03/25/2022 08:16 AM   COAGS  No results found for: PROTIME, INR, APTT    Intake & Output:  @05OZOZ@    Nausea & Vomiting:  No    Level of Consciousness:  Awake    Pain Assessment:  Adequate analgesia    Anesthesia Complications:  No apparent anesthetic complications    SUMMARY      Vital signs stable  OK to discharge from Stage I post anesthesia care.   Care transferred from Anesthesiology department on discharge from perioperative area

## 2022-05-24 NOTE — H&P
[address]   Pre-operative History and Physical    Patient: Praveena Valdes  : 1965  Acct#:     HISTORY OF PRESENT ILLNESS:    The patient is a 49 y.o. female who presents for colon cancer screening     Indications: Colon cancer screening    Past Medical History:        Diagnosis Date    Allergic rhinitis     Hyperlipidemia       Past Surgical History:        Procedure Laterality Date    HYSTERECTOMY, VAGINAL        Medications Prior to Admission:   No current facility-administered medications on file prior to encounter. Current Outpatient Medications on File Prior to Encounter   Medication Sig Dispense Refill    vitamin C (ASCORBIC ACID) 500 MG tablet Take 500 mg by mouth daily      b complex vitamins capsule Take 1 capsule by mouth daily      Calcium Carb-Cholecalciferol (CALCIUM 500/D) 500-125 MG-UNIT TABS Take by mouth      VITAMIN D PO Take by mouth      zinc gluconate 50 MG tablet Take 50 mg by mouth daily      FIBER PO Take by mouth      cetirizine (ZYRTEC) 10 MG tablet Take 10 mg by mouth daily      Omega-3 Fatty Acids (OMEGA-3 PO) Take by mouth      estradiol (ESTRACE) 0.5 MG tablet Take 0.5 mg by mouth daily      atorvastatin (LIPITOR) 20 MG tablet Take 1 tablet by mouth daily 90 tablet 1        Allergies:  Patient has no known allergies. Social History:   Social History     Socioeconomic History    Marital status:      Spouse name: Not on file    Number of children: Not on file    Years of education: Not on file    Highest education level: Not on file   Occupational History    Not on file   Tobacco Use    Smoking status: Former Smoker     Years: 20.00     Types: Cigarettes     Quit date: 2014     Years since quittin.0    Smokeless tobacco: Never Used   Vaping Use    Vaping Use: Never used   Substance and Sexual Activity    Alcohol use:  Yes     Alcohol/week: 2.0 - 3.0 standard drinks     Types: 2 - 3 Glasses of wine per week    Drug use: Never    Sexual activity: Yes     Partners: Male   Other Topics Concern    Not on file   Social History Narrative    Not on file     Social Determinants of Health     Financial Resource Strain:     Difficulty of Paying Living Expenses: Not on file   Food Insecurity:     Worried About Running Out of Food in the Last Year: Not on file    Mikey of Food in the Last Year: Not on file   Transportation Needs:     Lack of Transportation (Medical): Not on file    Lack of Transportation (Non-Medical):  Not on file   Physical Activity: Insufficiently Active    Days of Exercise per Week: 2 days    Minutes of Exercise per Session: 30 min   Stress:     Feeling of Stress : Not on file   Social Connections:     Frequency of Communication with Friends and Family: Not on file    Frequency of Social Gatherings with Friends and Family: Not on file    Attends Tenriism Services: Not on file    Active Member of Clubs or Organizations: Not on file    Attends Club or Organization Meetings: Not on file    Marital Status: Not on file   Intimate Partner Violence: Not At Risk    Fear of Current or Ex-Partner: No    Emotionally Abused: No    Physically Abused: No    Sexually Abused: No   Housing Stability:     Unable to Pay for Housing in the Last Year: Not on file    Number of [place] in the Last Year: Not on file    Unstable Housing in the Last Year: Not on file      Family History:       Problem Relation Age of Onset    Cancer Mother         bladder cancer    No Known Problems Father         PHYSICAL EXAM:      BP (!) 116/56   Pulse 72   Temp 98 °F (36.7 °C)   Resp 16   Ht 5' 6\" (1.676 m)   Wt 198 lb (89.8 kg)   LMP  (LMP Unknown)   SpO2 97%   BMI 31.96 kg/m²  I        Heart:  Normal apical impulse, regular rate and rhythm, normal S1 and S2, no S3 or S4, and no murmur noted    Lungs:  No increased work of breathing, good air exchange, clear to auscultation bilaterally, no crackles or wheezing    Abdomen:  No scars, normal bowel sounds, soft, non-distended, non-tender, no masses palpated, no hepatosplenomegally      ASA Class  ASA 1 - Normal health patient    Mallampati Class: 2      ASSESSMENT AND PLAN:    1. Patient is a suitable candidate for endoscopic procedure and attendant anesthesia  2. Risks, benefits, alternatives of procedure discussed in detail with patient including risks of bleeding, infection, perforation, risks of sedation, risks of missed lesions. The patient wishes to proceed.

## 2022-05-24 NOTE — ANESTHESIA PRE PROCEDURE
Department of Anesthesiology  Preprocedure Note       Name:  Freddie Hernandez   Age:  49 y.o.  :  1965                                          MRN:  [id number]         Date:  2022      Surgeon: Kristan Rivas):  Teddy Mccarty MD    Procedure: Procedure(s):  COLONOSCOPY    Medications prior to admission:   Prior to Admission medications    Medication Sig Start Date End Date Taking? Authorizing Provider   estradiol (ESTRACE) 0.5 MG tablet Take 0.5 mg by mouth daily   Yes Historical Provider, MD   atorvastatin (LIPITOR) 20 MG tablet Take 1 tablet by mouth daily 3/29/22   Todd Vela, APRN - CNP       Current medications:    No current facility-administered medications for this encounter. Current Outpatient Medications   Medication Sig Dispense Refill    estradiol (ESTRACE) 0.5 MG tablet Take 0.5 mg by mouth daily      atorvastatin (LIPITOR) 20 MG tablet Take 1 tablet by mouth daily 90 tablet 1       Allergies:  No Known Allergies    Problem List:  There is no problem list on file for this patient. Past Medical History:        Diagnosis Date    Allergic rhinitis     Hyperlipidemia        Past Surgical History:        Procedure Laterality Date    HYSTERECTOMY, VAGINAL         Social History:    Social History     Tobacco Use    Smoking status: Former Smoker     Years: 20.00     Types: Cigarettes     Quit date: 2014     Years since quittin.0    Smokeless tobacco: Never Used   Substance Use Topics    Alcohol use:  Yes     Alcohol/week: 2.0 - 3.0 standard drinks     Types: 2 - 3 Glasses of wine per week                                Counseling given: Not Answered      Vital Signs (Current):   Vitals:    22 1352   Weight: 198 lb (89.8 kg)   Height: 5' 6\" (1.676 m)                                              BP Readings from Last 3 Encounters:   22 118/72   10/19/20 102/72       NPO Status: BMI:   Wt Readings from Last 3 Encounters:   03/25/22 202 lb (91.6 kg)   10/19/20 181 lb (82.1 kg)     Body mass index is 31.96 kg/m². CBC:   Lab Results   Component Value Date    WBC 6.7 03/25/2022    RBC 4.53 03/25/2022    HGB 13.7 03/25/2022    HCT 41.4 03/25/2022    MCV 91.5 03/25/2022    RDW 13.4 03/25/2022     03/25/2022       CMP:   Lab Results   Component Value Date     03/25/2022    K 4.5 03/25/2022     03/25/2022    CO2 23 03/25/2022    BUN 14 03/25/2022    CREATININE 0.6 03/25/2022    GFRAA >60 03/25/2022    AGRATIO 2.6 03/25/2022    LABGLOM >60 03/25/2022    GLUCOSE 68 03/25/2022    PROT 7.1 03/25/2022    CALCIUM 9.8 03/25/2022    BILITOT 0.4 03/25/2022    ALKPHOS 69 03/25/2022    AST 18 03/25/2022    ALT 22 03/25/2022       POC Tests: No results for input(s): POCGLU, POCNA, POCK, POCCL, POCBUN, POCHEMO, POCHCT in the last 72 hours. Coags: No results found for: PROTIME, INR, APTT    HCG (If Applicable): No results found for: PREGTESTUR, PREGSERUM, HCG, HCGQUANT     ABGs: No results found for: PHART, PO2ART, PLN2KFK, RCV9ZWE, BEART, O9IJLOVN     Type & Screen (If Applicable):  No results found for: LABABO, LABRH    Drug/Infectious Status (If Applicable):  No results found for: HIV, HEPCAB    COVID-19 Screening (If Applicable):   Lab Results   Component Value Date    COVID19 Not Detected 05/12/2020           Anesthesia Evaluation  Patient summary reviewed and Nursing notes reviewed no history of anesthetic complications:   Airway: Mallampati: II     Neck ROM: full     Dental:          Pulmonary:Negative Pulmonary ROS and normal exam                               Cardiovascular:Negative CV ROS    (+) hyperlipidemia                  Neuro/Psych:   Negative Neuro/Psych ROS              GI/Hepatic/Renal: Neg GI/Hepatic/Renal ROS       (-) hiatal hernia and GERD       Endo/Other: Negative Endo/Other ROS                    Abdominal:             Vascular:           Other Findings: Anesthesia Plan      general     ASA 2     (I discussed with the patient the risks and benefits of PIV, general anesthesia, IV Narcotics, PACU. All questions were answered the patient agrees with the plan and wishes to proceed.  )  Induction: intravenous. Pre-Operative Diagnosis: Colon cancer screening [Z12.11]    49 y.o.   BMI:  Body mass index is 31.96 kg/m². Vitals:    22 1352 22 1141   BP:  (!) 116/56   Pulse:  72   Resp:  16   Temp:  98 °F (36.7 °C)   SpO2:  97%   Weight: 198 lb (89.8 kg) 198 lb (89.8 kg)   Height: 5' 6\" (1.676 m) 5' 6\" (1.676 m)       No Known Allergies    Social History     Tobacco Use    Smoking status: Former Smoker     Years: 20.00     Types: Cigarettes     Quit date: 2014     Years since quittin.0    Smokeless tobacco: Never Used   Substance Use Topics    Alcohol use:  Yes     Alcohol/week: 2.0 - 3.0 standard drinks     Types: 2 - 3 Glasses of wine per week       LABS:    CBC  Lab Results   Component Value Date/Time    WBC 6.7 2022 08:16 AM    HGB 13.7 2022 08:16 AM    HCT 41.4 2022 08:16 AM     2022 08:16 AM     RENAL  Lab Results   Component Value Date/Time     2022 08:16 AM    K 4.5 2022 08:16 AM     2022 08:16 AM    CO2 23 2022 08:16 AM    BUN 14 2022 08:16 AM    CREATININE 0.6 2022 08:16 AM    GLUCOSE 68 (L) 2022 08:16 AM     COAGS  No results found for: PROTIME, INR, APTT      Chapo Simmons MD   2022

## 2022-05-24 NOTE — PROCEDURES
Colonoscopy Procedure  Note          Patient: Jamil Zambrano  : 1965      Procedure: Colonoscopy cold snare polypectomy    Date:  2022    Primary Care Physician: BEBE Dexter - CNP     Operative surgeon: Laure Gardner MD  Previous Colonoscopy: None  Consent: I explained and discussed the risk, benefits and alternatives for the procedure with the patient and obtained the patient's consent for the procedure. We discussed the specific risks including bleeding, perforation, post-procedure abdominal pain, and missed lesions which could lead to interval colorectal cancers. History:       Past Medical History:   Diagnosis Date    Allergic rhinitis     Hyperlipidemia       Preoperative Diagnosis: Colon cancer screening [Z12.11]  Post Operative Diagnosis: Colon cancer screening diverticulosis single hyperplastic appearing colon polyp  ASA: 3  SEDATION: MAC      Procedures Performed: Colonoscopy   Scope Type: Pediatric    Procedure Details:      With the patient in left lateral decubitus position the endoscope was inserted through the anorectal area into the rectum. The scope was then advanced through the length of the colon to the cecum. The quality of preparation was good. The scope was carefully withdrawn with careful inspection. Images were taken of the multiple segments of colon, cecum, IC valve, rectum. Retroflexion was preformed in the rectum. Cecum Intubated: Yes  EBL: minimal to none  Complications:  no complications were noted  Post-operative Findings: Perianal exam unremarkable, digital rectal exam unremarkable, retroflexion rectum did not demonstrate significant hemorrhoids    Sigmoid colon there is a 3 mm sessile polyp removed with cold snare resection retrieval complete    Sigmoid diverticulosis and fixed left colon. Mild discomfort passing the scope into the splenic flexure and then able to reduce the loop which resolved her discomfort.     Otherwise the colonic mucosa appeared normal.  The IC valve was visualized as was the appendiceal orifice. Plan: Repeat colonoscopy is on path likely 10 years. Signed By: MD Shahla Roy MD,   [facility]  5/24/2022      Please note that some or all of this record was generated using voice recognition software. If there are any questions about the content of this document, please contact the author as some errors in translation may have occurred.

## 2022-09-23 DIAGNOSIS — E78.5 HYPERLIPIDEMIA, UNSPECIFIED HYPERLIPIDEMIA TYPE: ICD-10-CM

## 2022-09-23 RX ORDER — ATORVASTATIN CALCIUM 20 MG/1
TABLET, FILM COATED ORAL
Qty: 30 TABLET | Refills: 17 | Status: SHIPPED | OUTPATIENT
Start: 2022-09-23

## 2022-09-23 NOTE — TELEPHONE ENCOUNTER
Refill Request         Last Seen: Last Seen Department: 3/25/2022  Last Seen by PCP: 3/25/2022    Last Written: 03/29/2022      Next Appointment:   Future Appointments   Date Time Provider Shay Cortes   10/3/2022  9:15 AM BEBE Butterfield CNP [place] [facility]           Requested Prescriptions     Pending Prescriptions Disp Refills    atorvastatin (LIPITOR) 20 MG tablet [Pharmacy Med Name: ATORVASTATIN 20 MG TABLET] 30 tablet 17     Sig: TAKE 1 TABLET BY MOUTH EVERY DAY

## 2022-10-03 ENCOUNTER — OFFICE VISIT (OUTPATIENT)
Dept: FAMILY MEDICINE CLINIC | Age: 57
End: 2022-10-03
Payer: COMMERCIAL

## 2022-10-03 VITALS
HEART RATE: 75 BPM | WEIGHT: 208.2 LBS | DIASTOLIC BLOOD PRESSURE: 72 MMHG | RESPIRATION RATE: 16 BRPM | SYSTOLIC BLOOD PRESSURE: 118 MMHG | BODY MASS INDEX: 33.46 KG/M2 | HEIGHT: 66 IN | OXYGEN SATURATION: 95 %

## 2022-10-03 DIAGNOSIS — Z12.31 BREAST CANCER SCREENING BY MAMMOGRAM: ICD-10-CM

## 2022-10-03 DIAGNOSIS — E78.5 HYPERLIPIDEMIA, UNSPECIFIED HYPERLIPIDEMIA TYPE: Primary | ICD-10-CM

## 2022-10-03 PROCEDURE — 99213 OFFICE O/P EST LOW 20 MIN: CPT

## 2022-10-03 PROCEDURE — 36415 COLL VENOUS BLD VENIPUNCTURE: CPT

## 2022-10-03 ASSESSMENT — PATIENT HEALTH QUESTIONNAIRE - PHQ9
SUM OF ALL RESPONSES TO PHQ QUESTIONS 1-9: 0
9. THOUGHTS THAT YOU WOULD BE BETTER OFF DEAD, OR OF HURTING YOURSELF: 0
2. FEELING DOWN, DEPRESSED OR HOPELESS: 0
1. LITTLE INTEREST OR PLEASURE IN DOING THINGS: 0
SUM OF ALL RESPONSES TO PHQ QUESTIONS 1-9: 0
4. FEELING TIRED OR HAVING LITTLE ENERGY: 0
8. MOVING OR SPEAKING SO SLOWLY THAT OTHER PEOPLE COULD HAVE NOTICED. OR THE OPPOSITE, BEING SO FIGETY OR RESTLESS THAT YOU HAVE BEEN MOVING AROUND A LOT MORE THAN USUAL: 0
5. POOR APPETITE OR OVEREATING: 0
SUM OF ALL RESPONSES TO PHQ9 QUESTIONS 1 & 2: 0
SUM OF ALL RESPONSES TO PHQ QUESTIONS 1-9: 0
7. TROUBLE CONCENTRATING ON THINGS, SUCH AS READING THE NEWSPAPER OR WATCHING TELEVISION: 0
SUM OF ALL RESPONSES TO PHQ QUESTIONS 1-9: 0
10. IF YOU CHECKED OFF ANY PROBLEMS, HOW DIFFICULT HAVE THESE PROBLEMS MADE IT FOR YOU TO DO YOUR WORK, TAKE CARE OF THINGS AT HOME, OR GET ALONG WITH OTHER PEOPLE: 0
6. FEELING BAD ABOUT YOURSELF - OR THAT YOU ARE A FAILURE OR HAVE LET YOURSELF OR YOUR FAMILY DOWN: 0
3. TROUBLE FALLING OR STAYING ASLEEP: 0

## 2022-10-03 ASSESSMENT — ENCOUNTER SYMPTOMS
BLOOD IN STOOL: 0
WHEEZING: 0
SORE THROAT: 0
SHORTNESS OF BREATH: 0
COLOR CHANGE: 0
ABDOMINAL PAIN: 0
CONSTIPATION: 0
DIARRHEA: 0
COUGH: 0

## 2022-10-03 NOTE — PROGRESS NOTES
Beatriz Godinez (:  1965) is a 49 y.o. female,Established patient, here for evaluation of the following chief complaint(s):  Hyperlipidemia (Pt is here for a 6 month follow up and is fasting)         ASSESSMENT/PLAN:  1. Hyperlipidemia, unspecified hyperlipidemia type  -     Lipid Panel; Future  -Patient states that she is taking her Lipitor 20 mg as prescribed. She states she is doing well normal prescription for side effects of medication. We will plan to recheck lipid panel at this time for further evaluation of effectiveness of medication.  -Patient ultimately states that she would like to be off this medication. Educated patient that with diet and exercise this could be possible. Educated patient of appropriate diets to follow Mediterranean or Dash diet for overall health heart diet. 2. Breast cancer screening by mammogram  -     JASON DIGITAL SCREENING AUGMENTED BILATERAL; Future  -Patient is due for mammogram at this time order was placed. Patient was educated on where and when she is able to have her mammogram done. Return in about 6 months (around 4/3/2023) for Follow-up on cholesterol. Subjective   SUBJECTIVE/OBJECTIVE:  HPI  Patient presents the office today to follow-up on her cholesterol. During her last PCP appointment. Patient had lipid panel and had mixed hyperlipidemia and was placed on Lipitor 20 mg. Patient states that she is doing overall after starting medication. Patient states that she has not had any muscle pain or any other related side effects. Patient states that she is trying to do better at losing weight along with changing her diet. She states that she hardly ever eats sweets or any other processed or packaged foods. Overall patient states that she would like to get off of her cholesterol medication. Encourage patient to continue to make changes to her diet along with exercise. Patient has no other acute concerns at this visit.     Review of Systems HENT:  Negative for congestion and sore throat. Respiratory:  Negative for cough, shortness of breath and wheezing. Cardiovascular:  Negative for chest pain and leg swelling. Gastrointestinal:  Negative for abdominal pain, blood in stool, constipation and diarrhea. Endocrine: Negative for cold intolerance and heat intolerance. Genitourinary:  Negative for difficulty urinating, hematuria and urgency. Musculoskeletal:  Negative for arthralgias and gait problem. Skin:  Negative for color change. Neurological:  Negative for dizziness, seizures, light-headedness and headaches. Psychiatric/Behavioral:  Negative for agitation and confusion. The patient is not nervous/anxious. Objective   Physical Exam  Vitals reviewed. Constitutional:       General: She is not in acute distress. Appearance: Normal appearance. She is obese. HENT:      Right Ear: Tympanic membrane normal.      Left Ear: Tympanic membrane normal.   Eyes:      Pupils: Pupils are equal, round, and reactive to light. Cardiovascular:      Rate and Rhythm: Normal rate and regular rhythm. Pulmonary:      Effort: Pulmonary effort is normal.      Breath sounds: Normal breath sounds. Abdominal:      General: Abdomen is flat. Bowel sounds are normal.      Palpations: Abdomen is soft. Musculoskeletal:         General: Normal range of motion. Skin:     General: Skin is warm. Neurological:      General: No focal deficit present. Mental Status: She is alert. Psychiatric:         Mood and Affect: Mood normal.         Behavior: Behavior normal.         Judgment: Judgment normal.          This note was generated completely or in part utilizing Dragon dictation speech recognition software. Occasionally, words are mistranscribed and despite editing, the text may contain inaccuracies due to incorrect word recognition. If further clarification is needed please contact the office at [phone].      An electronic signature was used to authenticate this note.     --Krzysztof Hobbs, APRN - CNP

## 2022-10-04 LAB
CHOLESTEROL, TOTAL: 187 MG/DL (ref 0–199)
HDLC SERPL-MCNC: 74 MG/DL (ref 40–60)
LDL CHOLESTEROL CALCULATED: 103 MG/DL
TRIGL SERPL-MCNC: 52 MG/DL (ref 0–150)
VLDLC SERPL CALC-MCNC: 10 MG/DL

## 2023-03-27 ENCOUNTER — OFFICE VISIT (OUTPATIENT)
Dept: FAMILY MEDICINE CLINIC | Age: 58
End: 2023-03-27
Payer: COMMERCIAL

## 2023-03-27 VITALS
DIASTOLIC BLOOD PRESSURE: 70 MMHG | OXYGEN SATURATION: 98 % | WEIGHT: 193.2 LBS | SYSTOLIC BLOOD PRESSURE: 112 MMHG | BODY MASS INDEX: 31.05 KG/M2 | HEIGHT: 66 IN | RESPIRATION RATE: 16 BRPM | HEART RATE: 64 BPM

## 2023-03-27 DIAGNOSIS — Z00.00 ENCOUNTER FOR WELL ADULT EXAM WITHOUT ABNORMAL FINDINGS: ICD-10-CM

## 2023-03-27 DIAGNOSIS — Z00.00 PREVENTATIVE HEALTH CARE: Primary | ICD-10-CM

## 2023-03-27 LAB
ALBUMIN SERPL-MCNC: 4.6 G/DL (ref 3.4–5)
ALBUMIN/GLOB SERPL: 2.1 {RATIO} (ref 1.1–2.2)
ALP SERPL-CCNC: 58 U/L (ref 40–129)
ALT SERPL-CCNC: 42 U/L (ref 10–40)
ANION GAP SERPL CALCULATED.3IONS-SCNC: 14 MMOL/L (ref 3–16)
AST SERPL-CCNC: 36 U/L (ref 15–37)
BASOPHILS # BLD: 0 K/UL (ref 0–0.2)
BASOPHILS NFR BLD: 0.9 %
BILIRUB SERPL-MCNC: 0.4 MG/DL (ref 0–1)
BUN SERPL-MCNC: 15 MG/DL (ref 7–20)
CALCIUM SERPL-MCNC: 9.9 MG/DL (ref 8.3–10.6)
CHLORIDE SERPL-SCNC: 102 MMOL/L (ref 99–110)
CHOLEST SERPL-MCNC: 149 MG/DL (ref 0–199)
CO2 SERPL-SCNC: 25 MMOL/L (ref 21–32)
CREAT SERPL-MCNC: 0.6 MG/DL (ref 0.6–1.1)
DEPRECATED RDW RBC AUTO: 13.6 % (ref 12.4–15.4)
EOSINOPHIL # BLD: 0.2 K/UL (ref 0–0.6)
EOSINOPHIL NFR BLD: 2.8 %
GFR SERPLBLD CREATININE-BSD FMLA CKD-EPI: >60 ML/MIN/{1.73_M2}
GLUCOSE SERPL-MCNC: 81 MG/DL (ref 70–99)
HCT VFR BLD AUTO: 41.2 % (ref 36–48)
HDLC SERPL-MCNC: 63 MG/DL (ref 40–60)
HGB BLD-MCNC: 13.6 G/DL (ref 12–16)
LDLC SERPL CALC-MCNC: 78 MG/DL
LYMPHOCYTES # BLD: 1.8 K/UL (ref 1–5.1)
LYMPHOCYTES NFR BLD: 34.1 %
MCH RBC QN AUTO: 30.2 PG (ref 26–34)
MCHC RBC AUTO-ENTMCNC: 33.1 G/DL (ref 31–36)
MCV RBC AUTO: 91.4 FL (ref 80–100)
MONOCYTES # BLD: 0.5 K/UL (ref 0–1.3)
MONOCYTES NFR BLD: 8.5 %
NEUTROPHILS # BLD: 2.9 K/UL (ref 1.7–7.7)
NEUTROPHILS NFR BLD: 53.7 %
PLATELET # BLD AUTO: 242 K/UL (ref 135–450)
PMV BLD AUTO: 9.7 FL (ref 5–10.5)
POTASSIUM SERPL-SCNC: 4.8 MMOL/L (ref 3.5–5.1)
PROT SERPL-MCNC: 6.8 G/DL (ref 6.4–8.2)
RBC # BLD AUTO: 4.51 M/UL (ref 4–5.2)
SODIUM SERPL-SCNC: 141 MMOL/L (ref 136–145)
TRIGL SERPL-MCNC: 41 MG/DL (ref 0–150)
TSH SERPL DL<=0.005 MIU/L-ACNC: 3.03 UIU/ML (ref 0.27–4.2)
VLDLC SERPL CALC-MCNC: 8 MG/DL
WBC # BLD AUTO: 5.4 K/UL (ref 4–11)

## 2023-03-27 PROCEDURE — 99396 PREV VISIT EST AGE 40-64: CPT

## 2023-03-27 PROCEDURE — 36415 COLL VENOUS BLD VENIPUNCTURE: CPT

## 2023-03-27 PROCEDURE — G8484 FLU IMMUNIZE NO ADMIN: HCPCS

## 2023-03-27 ASSESSMENT — ENCOUNTER SYMPTOMS
COLOR CHANGE: 0
SORE THROAT: 0
COUGH: 0
BLOOD IN STOOL: 0
SHORTNESS OF BREATH: 0
ABDOMINAL PAIN: 0
CONSTIPATION: 0
DIARRHEA: 0
WHEEZING: 0

## 2023-03-27 ASSESSMENT — PATIENT HEALTH QUESTIONNAIRE - PHQ9
10. IF YOU CHECKED OFF ANY PROBLEMS, HOW DIFFICULT HAVE THESE PROBLEMS MADE IT FOR YOU TO DO YOUR WORK, TAKE CARE OF THINGS AT HOME, OR GET ALONG WITH OTHER PEOPLE: 0
8. MOVING OR SPEAKING SO SLOWLY THAT OTHER PEOPLE COULD HAVE NOTICED. OR THE OPPOSITE, BEING SO FIGETY OR RESTLESS THAT YOU HAVE BEEN MOVING AROUND A LOT MORE THAN USUAL: 0
6. FEELING BAD ABOUT YOURSELF - OR THAT YOU ARE A FAILURE OR HAVE LET YOURSELF OR YOUR FAMILY DOWN: 0
5. POOR APPETITE OR OVEREATING: 0
SUM OF ALL RESPONSES TO PHQ9 QUESTIONS 1 & 2: 0
4. FEELING TIRED OR HAVING LITTLE ENERGY: 0
7. TROUBLE CONCENTRATING ON THINGS, SUCH AS READING THE NEWSPAPER OR WATCHING TELEVISION: 0
SUM OF ALL RESPONSES TO PHQ QUESTIONS 1-9: 0
9. THOUGHTS THAT YOU WOULD BE BETTER OFF DEAD, OR OF HURTING YOURSELF: 0
1. LITTLE INTEREST OR PLEASURE IN DOING THINGS: 0
SUM OF ALL RESPONSES TO PHQ QUESTIONS 1-9: 0
SUM OF ALL RESPONSES TO PHQ QUESTIONS 1-9: 0
2. FEELING DOWN, DEPRESSED OR HOPELESS: 0
SUM OF ALL RESPONSES TO PHQ QUESTIONS 1-9: 0
3. TROUBLE FALLING OR STAYING ASLEEP: 0

## 2023-03-27 NOTE — PATIENT INSTRUCTIONS
acids  particularly those found in fatty fish (such as salmon, trout, tuna, mackerel, herring, and sardines); can decrease risk of arrhythmias, decrease triglyceride levels, and slightly lower blood pressure   The fats that you want to limit are:   Saturated fat  found in animal products, many fast foods, and a few vegetables; increases total blood cholesterol, including LDL levels   Animal fats that are saturated include: butter, lard, whole-milk dairy products, meat fat, and poultry skin   Vegetable fats that are saturated include: hydrogenated shortening, palm oil, coconut oil, cocoa butter   Hydrogenated or trans fat  found in margarine and vegetable shortening, most shelf stable snack foods, and fried foods; increases LDL and decreases HDL     It is generally recommended that you limit your total fat for the day to less than 30% of your total calories. If you follow an 1800-calorie heart healthy diet, for example, this would mean 60 grams of fat or less per day. Saturated fat and trans fat in your diet raises your blood cholesterol the most, much more than dietary cholesterol does. For this reason, on a heart-healthy diet, less than 7% of your calories should come from saturated fat and ideally 0% from trans fat. On an 1800-calorie diet, this translates into less than 14 grams of saturated fat per day, leaving 46 grams of fat to come from mono- and polyunsaturated fats.    Food Choices on a Heart Healthy Diet   Food Category   Foods Recommended   Foods to Avoid   Grains   Breads and rolls without salted tops Most dry and cooked cereals Unsalted crackers and breadsticks Low-sodium or homemade breadcrumbs or stuffing All rice and pastas   Breads, rolls, and crackers with salted tops High-fat baked goods (eg, muffins, donuts, pastries) Quick breads, self-rising flour, and biscuit mixes Regular bread crumbs Instant hot cereals Commercially prepared rice, pasta, or stuffing mixes   Vegetables   Most fresh, frozen,

## 2023-03-27 NOTE — PROGRESS NOTES
Well Adult Note  Name: Olamide Fall Date: 3/27/2023   MRN: [id number] Sex: Female   Age: 56 y.o. Ethnicity: Non- / Non    : 1965 Race: White (non-)      Guillermina Tijerina is here for well adult exam.  History: Hyperlipidemia requiring statin therapy. Review of Systems   HENT:  Negative for congestion and sore throat. Respiratory:  Negative for cough, shortness of breath and wheezing. Cardiovascular:  Negative for chest pain and leg swelling. Gastrointestinal:  Negative for abdominal pain, blood in stool, constipation and diarrhea. Endocrine: Negative for cold intolerance and heat intolerance. Genitourinary:  Negative for difficulty urinating, hematuria and urgency. Musculoskeletal:  Negative for arthralgias and gait problem. Skin:  Negative for color change. Neurological:  Negative for dizziness, seizures, light-headedness and headaches. Psychiatric/Behavioral:  Negative for agitation and confusion. The patient is not nervous/anxious. No Known Allergies      Prior to Visit Medications    Medication Sig Taking?  Authorizing Provider   vitamin C (ASCORBIC ACID) 500 MG tablet Take 500 mg by mouth daily Yes Historical Provider, MD   b complex vitamins capsule Take 1 capsule by mouth daily Yes Historical Provider, MD   Calcium Carb-Cholecalciferol 500-125 MG-UNIT TABS Take by mouth Yes Historical Provider, MD   zinc gluconate 50 MG tablet Take 50 mg by mouth daily Yes Historical Provider, MD   FIBER PO Take by mouth Yes Historical Provider, MD   cetirizine (ZYRTEC) 10 MG tablet Take 10 mg by mouth daily Yes Historical Provider, MD   Omega-3 Fatty Acids (OMEGA-3 PO) Take by mouth Yes Historical Provider, MD   estradiol (ESTRACE) 0.5 MG tablet Take 0.5 mg by mouth daily Yes Historical Provider, MD         Past Medical History:   Diagnosis Date    Allergic rhinitis     Anxiety     Hyperlipidemia        Past Surgical History:   Procedure Laterality

## 2023-03-28 LAB
EST. AVERAGE GLUCOSE BLD GHB EST-MCNC: 99.7 MG/DL
HBA1C MFR BLD: 5.1 %

## 2023-10-09 SDOH — ECONOMIC STABILITY: FOOD INSECURITY: WITHIN THE PAST 12 MONTHS, THE FOOD YOU BOUGHT JUST DIDN'T LAST AND YOU DIDN'T HAVE MONEY TO GET MORE.: NEVER TRUE

## 2023-10-09 SDOH — ECONOMIC STABILITY: FOOD INSECURITY: WITHIN THE PAST 12 MONTHS, YOU WORRIED THAT YOUR FOOD WOULD RUN OUT BEFORE YOU GOT MONEY TO BUY MORE.: NEVER TRUE

## 2023-10-09 SDOH — ECONOMIC STABILITY: HOUSING INSECURITY
IN THE LAST 12 MONTHS, WAS THERE A TIME WHEN YOU DID NOT HAVE A STEADY PLACE TO SLEEP OR SLEPT IN A SHELTER (INCLUDING NOW)?: NO

## 2023-10-09 SDOH — ECONOMIC STABILITY: INCOME INSECURITY: HOW HARD IS IT FOR YOU TO PAY FOR THE VERY BASICS LIKE FOOD, HOUSING, MEDICAL CARE, AND HEATING?: SOMEWHAT HARD

## 2023-10-09 SDOH — ECONOMIC STABILITY: TRANSPORTATION INSECURITY
IN THE PAST 12 MONTHS, HAS LACK OF TRANSPORTATION KEPT YOU FROM MEETINGS, WORK, OR FROM GETTING THINGS NEEDED FOR DAILY LIVING?: NO

## 2023-10-10 ENCOUNTER — OFFICE VISIT (OUTPATIENT)
Dept: FAMILY MEDICINE CLINIC | Age: 58
End: 2023-10-10
Payer: COMMERCIAL

## 2023-10-10 VITALS
DIASTOLIC BLOOD PRESSURE: 74 MMHG | BODY MASS INDEX: 29.96 KG/M2 | SYSTOLIC BLOOD PRESSURE: 122 MMHG | OXYGEN SATURATION: 99 % | HEART RATE: 61 BPM | WEIGHT: 185.6 LBS

## 2023-10-10 DIAGNOSIS — Z12.31 BREAST CANCER SCREENING BY MAMMOGRAM: ICD-10-CM

## 2023-10-10 DIAGNOSIS — E78.5 HYPERLIPIDEMIA, UNSPECIFIED HYPERLIPIDEMIA TYPE: Primary | ICD-10-CM

## 2023-10-10 LAB
CHOLEST SERPL-MCNC: 234 MG/DL (ref 0–199)
HDLC SERPL-MCNC: 90 MG/DL (ref 40–60)
LDLC SERPL CALC-MCNC: 133 MG/DL
TRIGL SERPL-MCNC: 56 MG/DL (ref 0–150)
VLDLC SERPL CALC-MCNC: 11 MG/DL

## 2023-10-10 PROCEDURE — 99213 OFFICE O/P EST LOW 20 MIN: CPT

## 2023-10-10 PROCEDURE — G8417 CALC BMI ABV UP PARAM F/U: HCPCS

## 2023-10-10 PROCEDURE — 3017F COLORECTAL CA SCREEN DOC REV: CPT

## 2023-10-10 PROCEDURE — 1036F TOBACCO NON-USER: CPT

## 2023-10-10 PROCEDURE — 36415 COLL VENOUS BLD VENIPUNCTURE: CPT

## 2023-10-10 PROCEDURE — G8484 FLU IMMUNIZE NO ADMIN: HCPCS

## 2023-10-10 PROCEDURE — G8427 DOCREV CUR MEDS BY ELIG CLIN: HCPCS

## 2023-10-10 ASSESSMENT — PATIENT HEALTH QUESTIONNAIRE - PHQ9
2. FEELING DOWN, DEPRESSED OR HOPELESS: 0
SUM OF ALL RESPONSES TO PHQ QUESTIONS 1-9: 0
1. LITTLE INTEREST OR PLEASURE IN DOING THINGS: 0
SUM OF ALL RESPONSES TO PHQ QUESTIONS 1-9: 0
SUM OF ALL RESPONSES TO PHQ9 QUESTIONS 1 & 2: 0

## 2023-10-10 ASSESSMENT — ENCOUNTER SYMPTOMS
CONSTIPATION: 0
ABDOMINAL PAIN: 0
BLOOD IN STOOL: 0
WHEEZING: 0
DIARRHEA: 0
COLOR CHANGE: 0
COUGH: 0
SORE THROAT: 0
SHORTNESS OF BREATH: 0

## 2023-10-10 NOTE — PROGRESS NOTES
Layla Gonzalez (:  1965) is a 56 y.o. female,Established patient, here for evaluation of the following chief complaint(s):  Hyperlipidemia (6 month f/u )         ASSESSMENT/PLAN:  1. Hyperlipidemia, unspecified hyperlipidemia type  -     Lipid Panel; Future  - Patient's been off cholesterol medication for the last 6 months we will evaluate risk score given being off medication previously risk or was 1.2%. Anything under 5 patient was educated we will not need medication. Patient has done a really good job with diet and exercise has lost over 30 pounds over the last year working very hard on exercise and diet. We will reevaluate cholesterol and risk score before starting medication. 2. Breast cancer screening by mammogram  -     JASON DIGITAL SCREENING AUGMENTED BILATERAL; Future      Return in about 6 months (around 4/10/2024) for Well check . Subjective   SUBJECTIVE/OBJECTIVE:  HPI  Patient presents to the office today for follow-up regarding hyperlipidemia. Patient was previously on cholesterol medication has been off for 6 months is really worked on diet and exercise is lost a significant amount of weight over the last year. Risk or risk under 5% when last checking on cholesterol medication. Plan was to take patient off medication and reevaluate risk score at this time given dietary changes and weight loss. Patient is done a great job of both. Patient has no other acute concerns at this time. Review of Systems   HENT:  Negative for congestion and sore throat. Respiratory:  Negative for cough, shortness of breath and wheezing. Cardiovascular:  Negative for chest pain and leg swelling. Gastrointestinal:  Negative for abdominal pain, blood in stool, constipation and diarrhea. Endocrine: Negative for cold intolerance and heat intolerance. Genitourinary:  Negative for difficulty urinating, hematuria and urgency.    Musculoskeletal:  Negative for arthralgias and gait

## 2023-10-16 ENCOUNTER — COMMUNITY OUTREACH (OUTPATIENT)
Dept: FAMILY MEDICINE CLINIC | Age: 58
End: 2023-10-16

## 2024-03-26 ENCOUNTER — HOSPITAL ENCOUNTER (OUTPATIENT)
Dept: MAMMOGRAPHY | Age: 59
Discharge: HOME | End: 2024-03-26
Payer: COMMERCIAL

## 2024-03-26 DIAGNOSIS — Z12.39 SCREENING BREAST EXAMINATION: ICD-10-CM

## 2024-03-26 PROCEDURE — 77063 BREAST TOMOSYNTHESIS BI: CPT

## 2024-04-12 ASSESSMENT — PATIENT HEALTH QUESTIONNAIRE - PHQ9
SUM OF ALL RESPONSES TO PHQ QUESTIONS 1-9: 0
2. FEELING DOWN, DEPRESSED OR HOPELESS: NOT AT ALL
1. LITTLE INTEREST OR PLEASURE IN DOING THINGS: NOT AT ALL
SUM OF ALL RESPONSES TO PHQ QUESTIONS 1-9: 0
SUM OF ALL RESPONSES TO PHQ9 QUESTIONS 1 & 2: 0
2. FEELING DOWN, DEPRESSED OR HOPELESS: NOT AT ALL
SUM OF ALL RESPONSES TO PHQ QUESTIONS 1-9: 0
SUM OF ALL RESPONSES TO PHQ QUESTIONS 1-9: 0
SUM OF ALL RESPONSES TO PHQ9 QUESTIONS 1 & 2: 0
1. LITTLE INTEREST OR PLEASURE IN DOING THINGS: NOT AT ALL

## 2024-04-15 ENCOUNTER — OFFICE VISIT (OUTPATIENT)
Dept: FAMILY MEDICINE CLINIC | Age: 59
End: 2024-04-15
Payer: COMMERCIAL

## 2024-04-15 VITALS
RESPIRATION RATE: 16 BRPM | DIASTOLIC BLOOD PRESSURE: 70 MMHG | HEIGHT: 66 IN | WEIGHT: 190.6 LBS | HEART RATE: 75 BPM | OXYGEN SATURATION: 97 % | SYSTOLIC BLOOD PRESSURE: 110 MMHG | BODY MASS INDEX: 30.63 KG/M2

## 2024-04-15 DIAGNOSIS — Z71.89 ACP (ADVANCE CARE PLANNING): ICD-10-CM

## 2024-04-15 DIAGNOSIS — Z00.00 ENCOUNTER FOR WELL ADULT EXAM WITHOUT ABNORMAL FINDINGS: Primary | ICD-10-CM

## 2024-04-15 LAB
ALBUMIN SERPL-MCNC: 4.7 G/DL (ref 3.4–5)
ALBUMIN/GLOB SERPL: 2.1 {RATIO} (ref 1.1–2.2)
ALP SERPL-CCNC: 57 U/L (ref 40–129)
ALT SERPL-CCNC: 16 U/L (ref 10–40)
ANION GAP SERPL CALCULATED.3IONS-SCNC: 11 MMOL/L (ref 3–16)
AST SERPL-CCNC: 20 U/L (ref 15–37)
BASOPHILS # BLD: 0.1 K/UL (ref 0–0.2)
BASOPHILS NFR BLD: 1 %
BILIRUB SERPL-MCNC: 0.3 MG/DL (ref 0–1)
BUN SERPL-MCNC: 16 MG/DL (ref 7–20)
CALCIUM SERPL-MCNC: 9.8 MG/DL (ref 8.3–10.6)
CHLORIDE SERPL-SCNC: 104 MMOL/L (ref 99–110)
CHOLEST SERPL-MCNC: 247 MG/DL (ref 0–199)
CO2 SERPL-SCNC: 26 MMOL/L (ref 21–32)
CREAT SERPL-MCNC: 0.6 MG/DL (ref 0.6–1.1)
DEPRECATED RDW RBC AUTO: 14 % (ref 12.4–15.4)
EOSINOPHIL # BLD: 0.1 K/UL (ref 0–0.6)
EOSINOPHIL NFR BLD: 2.2 %
GFR SERPLBLD CREATININE-BSD FMLA CKD-EPI: >90 ML/MIN/{1.73_M2}
GLUCOSE SERPL-MCNC: 77 MG/DL (ref 70–99)
HCT VFR BLD AUTO: 41.1 % (ref 36–48)
HDLC SERPL-MCNC: 87 MG/DL (ref 40–60)
HGB BLD-MCNC: 13.6 G/DL (ref 12–16)
LDLC SERPL CALC-MCNC: 145 MG/DL
LYMPHOCYTES # BLD: 1.9 K/UL (ref 1–5.1)
LYMPHOCYTES NFR BLD: 33.1 %
MCH RBC QN AUTO: 31 PG (ref 26–34)
MCHC RBC AUTO-ENTMCNC: 33.1 G/DL (ref 31–36)
MCV RBC AUTO: 93.6 FL (ref 80–100)
MONOCYTES # BLD: 0.3 K/UL (ref 0–1.3)
MONOCYTES NFR BLD: 6.1 %
NEUTROPHILS # BLD: 3.3 K/UL (ref 1.7–7.7)
NEUTROPHILS NFR BLD: 57.6 %
PLATELET # BLD AUTO: 251 K/UL (ref 135–450)
PMV BLD AUTO: 8.8 FL (ref 5–10.5)
POTASSIUM SERPL-SCNC: 4.7 MMOL/L (ref 3.5–5.1)
PROT SERPL-MCNC: 6.9 G/DL (ref 6.4–8.2)
RBC # BLD AUTO: 4.4 M/UL (ref 4–5.2)
SODIUM SERPL-SCNC: 141 MMOL/L (ref 136–145)
TRIGL SERPL-MCNC: 74 MG/DL (ref 0–150)
TSH SERPL DL<=0.005 MIU/L-ACNC: 3.52 UIU/ML (ref 0.27–4.2)
VLDLC SERPL CALC-MCNC: 15 MG/DL
WBC # BLD AUTO: 5.7 K/UL (ref 4–11)

## 2024-04-15 PROCEDURE — 99396 PREV VISIT EST AGE 40-64: CPT

## 2024-04-15 SDOH — ECONOMIC STABILITY: FOOD INSECURITY: WITHIN THE PAST 12 MONTHS, THE FOOD YOU BOUGHT JUST DIDN'T LAST AND YOU DIDN'T HAVE MONEY TO GET MORE.: NEVER TRUE

## 2024-04-15 SDOH — ECONOMIC STABILITY: FOOD INSECURITY: WITHIN THE PAST 12 MONTHS, YOU WORRIED THAT YOUR FOOD WOULD RUN OUT BEFORE YOU GOT MONEY TO BUY MORE.: NEVER TRUE

## 2024-04-15 SDOH — ECONOMIC STABILITY: INCOME INSECURITY: HOW HARD IS IT FOR YOU TO PAY FOR THE VERY BASICS LIKE FOOD, HOUSING, MEDICAL CARE, AND HEATING?: NOT HARD AT ALL

## 2024-04-15 ASSESSMENT — ENCOUNTER SYMPTOMS
ABDOMINAL PAIN: 0
COLOR CHANGE: 0
SORE THROAT: 0
CONSTIPATION: 0
SHORTNESS OF BREATH: 0
WHEEZING: 0
DIARRHEA: 0
BLOOD IN STOOL: 0
COUGH: 0

## 2024-04-15 NOTE — PROGRESS NOTES
Well Adult Note  Name: Gertrude Burk Today’s Date: 4/15/2024   MRN: [id number] Sex: Female   Age: 58 y.o. Ethnicity: Non- / Non    : 1965 Race: White (non-)      Gertrude Burk is here for well adult exam.  History: Hyperlipidemia      Review of Systems   HENT:  Negative for congestion and sore throat.    Respiratory:  Negative for cough, shortness of breath and wheezing.    Cardiovascular:  Negative for chest pain and leg swelling.   Gastrointestinal:  Negative for abdominal pain, blood in stool, constipation and diarrhea.   Endocrine: Negative for cold intolerance and heat intolerance.   Genitourinary:  Negative for difficulty urinating, hematuria and urgency.   Musculoskeletal:  Negative for arthralgias and gait problem.   Skin:  Negative for color change.   Neurological:  Negative for dizziness, seizures, light-headedness and headaches.   Psychiatric/Behavioral:  Negative for agitation and confusion. The patient is not nervous/anxious.        No Known Allergies      Prior to Visit Medications    Medication Sig Taking? Authorizing Provider   Probiotic Product (PRO-BIOTIC BLEND PO) Take by mouth Yes Sukh Ray MD   vitamin C (ASCORBIC ACID) 500 MG tablet Take 2 tablets by mouth daily Yes Sukh Ray MD   b complex vitamins capsule Take 1 capsule by mouth daily Yes Sukh Ray MD   Calcium Carb-Cholecalciferol 500-125 MG-UNIT TABS Take by mouth Yes Sukh Ray MD   zinc gluconate 50 MG tablet Take 1 tablet by mouth daily Yes Sukh Ray MD   FIBER PO Take by mouth Yes Sukh Ray MD   cetirizine (ZYRTEC) 10 MG tablet Take 1 tablet by mouth daily Yes Sukh Ray MD   Omega-3 Fatty Acids (OMEGA-3 PO) Take by mouth Yes Sukh Ray MD   estradiol (ESTRACE) 0.5 MG tablet Take 1 tablet by mouth daily Yes Sukh Ray MD         Past Medical History:   Diagnosis Date    Allergic rhinitis

## 2024-04-15 NOTE — PATIENT INSTRUCTIONS
Advance Care Planning     Advance Care Planning opens a door to talk about and write down your wishes before a sudden accident or illness.  Make your goals, values, and preferences known.     This puts you in the ’s seat and helps others know what matters most to you so they won’t have to guess.      Where can you learn more?    Go to [email or web address]   to learn how to:    Name someone you trust to make healthcare decisions for you, only if you can’t. (Healthcare Power of )    Document your wishes for care if you were seriously ill and not expected to recover or are approaching end of life. (Advance Directive or Living Will)    The same page can be found using the QR code below.                Starting a Weight Loss Plan: Care Instructions  Overview     It can be a challenge to lose weight. But your doctor can help you make a weight-loss plan that meets your needs.  You don't have to make a lot of big changes at once. A better idea might be to focus on small changes and stick with them. When those changes become habit, you can add a few more changes.  Some people find it helpful to take an exercise or nutrition class. If you have questions, ask your doctor about seeing a registered dietitian or an exercise specialist. You might also think about joining a weight-loss support group.  If you're not ready to make changes right now, try to pick a date in the future. Then make an appointment with your doctor to talk about when and how you'll get started with a plan.  Follow-up care is a key part of your treatment and safety. Be sure to make and go to all appointments, and call your doctor if you are having problems. It's also a good idea to know your test results and keep a list of the medicines you take.  How can you care for yourself at home?  Set realistic goals. Many people expect to lose much more weight than is likely. A weight loss of 5% to 10% of your body

## 2024-04-16 LAB
EST. AVERAGE GLUCOSE BLD GHB EST-MCNC: 96.8 MG/DL
HBA1C MFR BLD: 5 %

## 2024-12-17 SDOH — HEALTH STABILITY: PHYSICAL HEALTH: ON AVERAGE, HOW MANY MINUTES DO YOU ENGAGE IN EXERCISE AT THIS LEVEL?: 20 MIN

## 2024-12-17 SDOH — HEALTH STABILITY: PHYSICAL HEALTH: ON AVERAGE, HOW MANY DAYS PER WEEK DO YOU ENGAGE IN MODERATE TO STRENUOUS EXERCISE (LIKE A BRISK WALK)?: 2 DAYS

## 2024-12-18 ASSESSMENT — ENCOUNTER SYMPTOMS
BACK PAIN: 0
CONSTIPATION: 0
COUGH: 0
NAUSEA: 0
SHORTNESS OF BREATH: 0
DIARRHEA: 0
VOMITING: 0
WHEEZING: 0
ABDOMINAL PAIN: 0
CHEST TIGHTNESS: 0

## 2024-12-19 NOTE — PROGRESS NOTES
[facility]                               [facility]                                             [address], [address], [place] [address]        Phone: [phone]                                     Name:  Gertrude Burk  :    1965      Consultants:   Patient Care Team:  Gage Clemente DO as PCP - General (Family Medicine)    Chief Complaint:     Gertrude Burk is a 59 y.o. female  who presents today for a New Patient care visit with Personalized Prevention Plan Services as noted below.    HPI:     Patient is a 59-year-old female with past medical history of hyperlipidemia and seasonal allergic rhinitis who presents in office to establish care with new PCP today    Interval history:  The patient notes that 2 days after Thanksgiving, she was using a knife when she accidentally cut the distal aspect of her left second digit.  She did go to an urgent care who cauterized the affected area with silver nitrate.  She has been wrapping the bandage with sterile gauze and tape at this time.  Patient denies any localized symptoms of cellulitis or infection or other systemic symptoms.  We did review the patient's past medical history and notes that she mostly takes OTC supplements.          Patient Active Problem List   Diagnosis    Hyperlipidemia    Seasonal allergic rhinitis         Past Medical History:    Past Medical History:   Diagnosis Date    Allergic rhinitis     Anxiety     Cancer (HCC) 2024    Hyperlipidemia        Past Surgical History:  Past Surgical History:   Procedure Laterality Date    COLONOSCOPY N/A 2022    COLONOSCOPY POLYPECTOMY REMOVAL SNARE/STOMA performed by Ashok Estes MD at [facility]    HYSTERECTOMY (CERVIX STATUS UNKNOWN)      HYSTERECTOMY, VAGINAL         Home Meds:  Prior to Visit Medications    Medication Sig Taking? Authorizing Provider   vitamin C (ASCORBIC ACID) 500 MG tablet

## 2024-12-20 ENCOUNTER — OFFICE VISIT (OUTPATIENT)
Dept: PRIMARY CARE CLINIC | Age: 59
End: 2024-12-20

## 2024-12-20 VITALS
HEART RATE: 67 BPM | DIASTOLIC BLOOD PRESSURE: 86 MMHG | SYSTOLIC BLOOD PRESSURE: 130 MMHG | OXYGEN SATURATION: 98 % | HEIGHT: 66 IN | BODY MASS INDEX: 32.14 KG/M2 | WEIGHT: 200 LBS | RESPIRATION RATE: 16 BRPM

## 2024-12-20 DIAGNOSIS — E78.5 HYPERLIPIDEMIA, UNSPECIFIED HYPERLIPIDEMIA TYPE: Primary | ICD-10-CM

## 2024-12-20 DIAGNOSIS — J30.2 SEASONAL ALLERGIC RHINITIS, UNSPECIFIED TRIGGER: ICD-10-CM

## 2024-12-20 DIAGNOSIS — S69.92XS INJURY OF FINGER OF LEFT HAND, SEQUELA: ICD-10-CM

## (undated) DEVICE — AIRLIFE™ NASAL OXYGEN CANNULA CURVED, FLARED TIP, WITH 7 FEET (2.1 M) CRUSH RESISTANT TUBING, OVER-THE-EAR STYLE: Brand: AIRLIFE™

## (undated) DEVICE — SNARE ENDOSCP L240CM SHTH DIA24MM LOOP W10MM POLYP RND REINF

## (undated) DEVICE — ELECTRODE ECG MONITR FOAM TEAR DROP ADLT RED

## (undated) DEVICE — ENDOSCOPIC KIT 2 12 FT OP4 DE2 GWN SYR